# Patient Record
Sex: MALE | Race: ASIAN | Employment: OTHER | URBAN - METROPOLITAN AREA
[De-identification: names, ages, dates, MRNs, and addresses within clinical notes are randomized per-mention and may not be internally consistent; named-entity substitution may affect disease eponyms.]

---

## 2022-06-17 ENCOUNTER — HOSPITAL ENCOUNTER (INPATIENT)
Age: 77
LOS: 2 days | Discharge: HOME | DRG: 291 | End: 2022-06-20
Attending: EMERGENCY MEDICINE | Admitting: INTERNAL MEDICINE
Payer: MEDICARE

## 2022-06-17 ENCOUNTER — APPOINTMENT (OUTPATIENT)
Dept: GENERAL RADIOLOGY | Age: 77
DRG: 291 | End: 2022-06-17

## 2022-06-17 DIAGNOSIS — I50.9 CONGESTIVE HEART FAILURE, UNSPECIFIED HF CHRONICITY, UNSPECIFIED HEART FAILURE TYPE (HCC): Primary | ICD-10-CM

## 2022-06-17 LAB
ABSOLUTE EOS #: 0.2 K/UL (ref 0–0.4)
ABSOLUTE LYMPH #: 0.6 K/UL (ref 1–4.8)
ABSOLUTE MONO #: 0.3 K/UL (ref 0.1–1.2)
ANION GAP SERPL CALCULATED.3IONS-SCNC: 11 MMOL/L (ref 9–17)
BASOPHILS # BLD: 1 % (ref 0–2)
BASOPHILS ABSOLUTE: 0.1 K/UL (ref 0–0.2)
BUN BLDV-MCNC: 14 MG/DL (ref 8–23)
CALCIUM SERPL-MCNC: 9.7 MG/DL (ref 8.6–10.4)
CHLORIDE BLD-SCNC: 100 MMOL/L (ref 98–107)
CO2: 25 MMOL/L (ref 20–31)
CREAT SERPL-MCNC: 0.86 MG/DL (ref 0.7–1.2)
EOSINOPHILS RELATIVE PERCENT: 2 % (ref 1–4)
GFR AFRICAN AMERICAN: >60 ML/MIN
GFR NON-AFRICAN AMERICAN: >60 ML/MIN
GFR SERPL CREATININE-BSD FRML MDRD: ABNORMAL ML/MIN/{1.73_M2}
GLUCOSE BLD-MCNC: 178 MG/DL (ref 70–99)
HCT VFR BLD CALC: 43.1 % (ref 41–53)
HEMOGLOBIN: 14.2 G/DL (ref 13.5–17.5)
INR BLD: 1
LYMPHOCYTES # BLD: 9 % (ref 24–44)
MCH RBC QN AUTO: 29.8 PG (ref 26–34)
MCHC RBC AUTO-ENTMCNC: 33 G/DL (ref 31–37)
MCV RBC AUTO: 90.3 FL (ref 80–100)
MONOCYTES # BLD: 5 % (ref 2–11)
PDW BLD-RTO: 16.3 % (ref 12.5–15.4)
PLATELET # BLD: 214 K/UL (ref 140–450)
PMV BLD AUTO: 8.8 FL (ref 6–12)
POTASSIUM SERPL-SCNC: 5.4 MMOL/L (ref 3.7–5.3)
PRO-BNP: 2417 PG/ML
PROTHROMBIN TIME: 10.3 SEC (ref 9.4–12.6)
RBC # BLD: 4.77 M/UL (ref 4.5–5.9)
SEG NEUTROPHILS: 83 % (ref 36–66)
SEGMENTED NEUTROPHILS ABSOLUTE COUNT: 5.5 K/UL (ref 1.8–7.7)
SODIUM BLD-SCNC: 136 MMOL/L (ref 135–144)
TROPONIN, HIGH SENSITIVITY: 42 NG/L (ref 0–22)
WBC # BLD: 6.7 K/UL (ref 3.5–11)

## 2022-06-17 PROCEDURE — 71045 X-RAY EXAM CHEST 1 VIEW: CPT

## 2022-06-17 PROCEDURE — 96374 THER/PROPH/DIAG INJ IV PUSH: CPT

## 2022-06-17 PROCEDURE — 6360000002 HC RX W HCPCS: Performed by: EMERGENCY MEDICINE

## 2022-06-17 PROCEDURE — 80048 BASIC METABOLIC PNL TOTAL CA: CPT

## 2022-06-17 PROCEDURE — 83880 ASSAY OF NATRIURETIC PEPTIDE: CPT

## 2022-06-17 PROCEDURE — 36415 COLL VENOUS BLD VENIPUNCTURE: CPT

## 2022-06-17 PROCEDURE — 84484 ASSAY OF TROPONIN QUANT: CPT

## 2022-06-17 PROCEDURE — 85025 COMPLETE CBC W/AUTO DIFF WBC: CPT

## 2022-06-17 PROCEDURE — 85610 PROTHROMBIN TIME: CPT

## 2022-06-17 PROCEDURE — 99285 EMERGENCY DEPT VISIT HI MDM: CPT

## 2022-06-17 RX ORDER — TORSEMIDE 10 MG/1
10 TABLET ORAL DAILY
Status: ON HOLD | COMMUNITY
End: 2022-06-20 | Stop reason: HOSPADM

## 2022-06-17 RX ORDER — FUROSEMIDE 10 MG/ML
40 INJECTION INTRAMUSCULAR; INTRAVENOUS ONCE
Status: COMPLETED | OUTPATIENT
Start: 2022-06-17 | End: 2022-06-17

## 2022-06-17 RX ORDER — POLYETHYLENE GLYCOL 3350 17 G/17G
17 POWDER, FOR SOLUTION ORAL DAILY
COMMUNITY

## 2022-06-17 RX ORDER — LISINOPRIL 5 MG/1
5 TABLET ORAL DAILY
COMMUNITY

## 2022-06-17 RX ORDER — ACETAMINOPHEN 325 MG/1
650 TABLET ORAL EVERY 6 HOURS PRN
COMMUNITY

## 2022-06-17 RX ORDER — M-VIT,TX,IRON,MINS/CALC/FOLIC 27MG-0.4MG
1 TABLET ORAL DAILY
COMMUNITY

## 2022-06-17 RX ORDER — ATORVASTATIN CALCIUM 80 MG/1
80 TABLET, FILM COATED ORAL NIGHTLY
COMMUNITY

## 2022-06-17 RX ORDER — ASPIRIN 81 MG/1
81 TABLET, CHEWABLE ORAL DAILY
COMMUNITY

## 2022-06-17 RX ORDER — OMEPRAZOLE 20 MG/1
20 CAPSULE, DELAYED RELEASE ORAL DAILY
COMMUNITY

## 2022-06-17 RX ADMIN — FUROSEMIDE 40 MG: 10 INJECTION, SOLUTION INTRAMUSCULAR; INTRAVENOUS at 23:39

## 2022-06-17 ASSESSMENT — PAIN - FUNCTIONAL ASSESSMENT: PAIN_FUNCTIONAL_ASSESSMENT: 0-10

## 2022-06-17 ASSESSMENT — LIFESTYLE VARIABLES: HOW OFTEN DO YOU HAVE A DRINK CONTAINING ALCOHOL: NEVER

## 2022-06-17 ASSESSMENT — PAIN DESCRIPTION - LOCATION: LOCATION: CHEST

## 2022-06-17 ASSESSMENT — PAIN DESCRIPTION - FREQUENCY: FREQUENCY: INTERMITTENT

## 2022-06-17 ASSESSMENT — PAIN DESCRIPTION - ORIENTATION: ORIENTATION: MID

## 2022-06-18 ENCOUNTER — APPOINTMENT (OUTPATIENT)
Dept: GENERAL RADIOLOGY | Age: 77
DRG: 291 | End: 2022-06-18

## 2022-06-18 PROBLEM — I50.9 DECOMPENSATED HEART FAILURE (HCC): Status: ACTIVE | Noted: 2022-06-18

## 2022-06-18 PROBLEM — E11.9 DIABETES (HCC): Status: ACTIVE | Noted: 2022-06-18

## 2022-06-18 PROBLEM — I48.91 ATRIAL FIBRILLATION (HCC): Status: ACTIVE | Noted: 2022-06-18

## 2022-06-18 PROBLEM — I25.10 CORONARY ARTERY DISEASE INVOLVING NATIVE CORONARY ARTERY OF NATIVE HEART WITHOUT ANGINA PECTORIS: Status: ACTIVE | Noted: 2022-06-18

## 2022-06-18 PROBLEM — E87.5 HYPERKALEMIA: Status: ACTIVE | Noted: 2022-06-18

## 2022-06-18 PROBLEM — Z79.4 TYPE 2 DIABETES MELLITUS WITHOUT COMPLICATION, WITH LONG-TERM CURRENT USE OF INSULIN (HCC): Status: ACTIVE | Noted: 2022-06-18

## 2022-06-18 PROBLEM — I48.0 PAROXYSMAL ATRIAL FIBRILLATION (HCC): Status: ACTIVE | Noted: 2022-06-18

## 2022-06-18 PROBLEM — I10 HYPERTENSION: Status: ACTIVE | Noted: 2022-06-18

## 2022-06-18 PROBLEM — I50.43 ACUTE ON CHRONIC COMBINED SYSTOLIC AND DIASTOLIC CONGESTIVE HEART FAILURE (HCC): Status: ACTIVE | Noted: 2022-06-18

## 2022-06-18 LAB
ANION GAP SERPL CALCULATED.3IONS-SCNC: 12 MMOL/L (ref 9–17)
BUN BLDV-MCNC: 14 MG/DL (ref 8–23)
CALCIUM SERPL-MCNC: 8.4 MG/DL (ref 8.6–10.4)
CHLORIDE BLD-SCNC: 99 MMOL/L (ref 98–107)
CO2: 22 MMOL/L (ref 20–31)
CREAT SERPL-MCNC: 0.77 MG/DL (ref 0.7–1.2)
GFR AFRICAN AMERICAN: >60 ML/MIN
GFR NON-AFRICAN AMERICAN: >60 ML/MIN
GFR SERPL CREATININE-BSD FRML MDRD: ABNORMAL ML/MIN/{1.73_M2}
GLUCOSE BLD-MCNC: 145 MG/DL (ref 75–110)
GLUCOSE BLD-MCNC: 167 MG/DL (ref 75–110)
GLUCOSE BLD-MCNC: 187 MG/DL (ref 70–99)
GLUCOSE BLD-MCNC: 234 MG/DL (ref 75–110)
GLUCOSE BLD-MCNC: 93 MG/DL (ref 75–110)
POTASSIUM SERPL-SCNC: 4.1 MMOL/L (ref 3.7–5.3)
SODIUM BLD-SCNC: 133 MMOL/L (ref 135–144)
TROPONIN, HIGH SENSITIVITY: 40 NG/L (ref 0–22)
TROPONIN, HIGH SENSITIVITY: 41 NG/L (ref 0–22)

## 2022-06-18 PROCEDURE — 2580000003 HC RX 258: Performed by: NURSE PRACTITIONER

## 2022-06-18 PROCEDURE — 6370000000 HC RX 637 (ALT 250 FOR IP): Performed by: INTERNAL MEDICINE

## 2022-06-18 PROCEDURE — 99223 1ST HOSP IP/OBS HIGH 75: CPT | Performed by: INTERNAL MEDICINE

## 2022-06-18 PROCEDURE — 6370000000 HC RX 637 (ALT 250 FOR IP): Performed by: NURSE PRACTITIONER

## 2022-06-18 PROCEDURE — 94760 N-INVAS EAR/PLS OXIMETRY 1: CPT

## 2022-06-18 PROCEDURE — 2060000000 HC ICU INTERMEDIATE R&B

## 2022-06-18 PROCEDURE — 6360000002 HC RX W HCPCS: Performed by: NURSE PRACTITIONER

## 2022-06-18 PROCEDURE — 80048 BASIC METABOLIC PNL TOTAL CA: CPT

## 2022-06-18 PROCEDURE — 36415 COLL VENOUS BLD VENIPUNCTURE: CPT

## 2022-06-18 PROCEDURE — 2700000000 HC OXYGEN THERAPY PER DAY

## 2022-06-18 PROCEDURE — 97116 GAIT TRAINING THERAPY: CPT

## 2022-06-18 PROCEDURE — 71046 X-RAY EXAM CHEST 2 VIEWS: CPT

## 2022-06-18 PROCEDURE — 82947 ASSAY GLUCOSE BLOOD QUANT: CPT

## 2022-06-18 PROCEDURE — 84484 ASSAY OF TROPONIN QUANT: CPT

## 2022-06-18 PROCEDURE — 97162 PT EVAL MOD COMPLEX 30 MIN: CPT

## 2022-06-18 PROCEDURE — 97535 SELF CARE MNGMENT TRAINING: CPT

## 2022-06-18 PROCEDURE — 97166 OT EVAL MOD COMPLEX 45 MIN: CPT

## 2022-06-18 RX ORDER — SODIUM CHLORIDE 9 MG/ML
INJECTION, SOLUTION INTRAVENOUS PRN
Status: DISCONTINUED | OUTPATIENT
Start: 2022-06-18 | End: 2022-06-20 | Stop reason: HOSPADM

## 2022-06-18 RX ORDER — SODIUM CHLORIDE 0.9 % (FLUSH) 0.9 %
10 SYRINGE (ML) INJECTION PRN
Status: DISCONTINUED | OUTPATIENT
Start: 2022-06-18 | End: 2022-06-20 | Stop reason: HOSPADM

## 2022-06-18 RX ORDER — ONDANSETRON 2 MG/ML
4 INJECTION INTRAMUSCULAR; INTRAVENOUS EVERY 6 HOURS PRN
Status: DISCONTINUED | OUTPATIENT
Start: 2022-06-18 | End: 2022-06-20 | Stop reason: HOSPADM

## 2022-06-18 RX ORDER — FUROSEMIDE 10 MG/ML
40 INJECTION INTRAMUSCULAR; INTRAVENOUS 2 TIMES DAILY
Status: DISCONTINUED | OUTPATIENT
Start: 2022-06-18 | End: 2022-06-19

## 2022-06-18 RX ORDER — INSULIN LISPRO 100 [IU]/ML
0-12 INJECTION, SOLUTION INTRAVENOUS; SUBCUTANEOUS
Status: DISCONTINUED | OUTPATIENT
Start: 2022-06-18 | End: 2022-06-20 | Stop reason: HOSPADM

## 2022-06-18 RX ORDER — INSULIN GLARGINE 100 [IU]/ML
25 INJECTION, SOLUTION SUBCUTANEOUS EVERY MORNING
Status: DISCONTINUED | OUTPATIENT
Start: 2022-06-18 | End: 2022-06-18

## 2022-06-18 RX ORDER — ACETAMINOPHEN 325 MG/1
650 TABLET ORAL EVERY 6 HOURS PRN
Status: DISCONTINUED | OUTPATIENT
Start: 2022-06-18 | End: 2022-06-20 | Stop reason: HOSPADM

## 2022-06-18 RX ORDER — INSULIN LISPRO 100 [IU]/ML
0-6 INJECTION, SOLUTION INTRAVENOUS; SUBCUTANEOUS NIGHTLY
Status: DISCONTINUED | OUTPATIENT
Start: 2022-06-18 | End: 2022-06-20 | Stop reason: HOSPADM

## 2022-06-18 RX ORDER — INSULIN GLARGINE 100 [IU]/ML
25 INJECTION, SOLUTION SUBCUTANEOUS NIGHTLY
Status: DISCONTINUED | OUTPATIENT
Start: 2022-06-18 | End: 2022-06-19

## 2022-06-18 RX ORDER — ACETAMINOPHEN 650 MG/1
650 SUPPOSITORY RECTAL EVERY 6 HOURS PRN
Status: DISCONTINUED | OUTPATIENT
Start: 2022-06-18 | End: 2022-06-20 | Stop reason: HOSPADM

## 2022-06-18 RX ORDER — SODIUM CHLORIDE 0.9 % (FLUSH) 0.9 %
5-40 SYRINGE (ML) INJECTION EVERY 12 HOURS SCHEDULED
Status: DISCONTINUED | OUTPATIENT
Start: 2022-06-18 | End: 2022-06-20 | Stop reason: HOSPADM

## 2022-06-18 RX ORDER — ATORVASTATIN CALCIUM 40 MG/1
80 TABLET, FILM COATED ORAL NIGHTLY
Status: DISCONTINUED | OUTPATIENT
Start: 2022-06-18 | End: 2022-06-20 | Stop reason: HOSPADM

## 2022-06-18 RX ORDER — ATORVASTATIN CALCIUM 40 MG/1
80 TABLET, FILM COATED ORAL DAILY
Status: DISCONTINUED | OUTPATIENT
Start: 2022-06-18 | End: 2022-06-18

## 2022-06-18 RX ORDER — DEXTROSE MONOHYDRATE 50 MG/ML
100 INJECTION, SOLUTION INTRAVENOUS PRN
Status: DISCONTINUED | OUTPATIENT
Start: 2022-06-18 | End: 2022-06-20 | Stop reason: HOSPADM

## 2022-06-18 RX ORDER — POLYETHYLENE GLYCOL 3350 17 G/17G
17 POWDER, FOR SOLUTION ORAL DAILY
Status: DISCONTINUED | OUTPATIENT
Start: 2022-06-18 | End: 2022-06-20 | Stop reason: HOSPADM

## 2022-06-18 RX ORDER — ASPIRIN 81 MG/1
81 TABLET, CHEWABLE ORAL DAILY
Status: DISCONTINUED | OUTPATIENT
Start: 2022-06-18 | End: 2022-06-20 | Stop reason: HOSPADM

## 2022-06-18 RX ORDER — ENOXAPARIN SODIUM 100 MG/ML
40 INJECTION SUBCUTANEOUS DAILY
Status: DISCONTINUED | OUTPATIENT
Start: 2022-06-18 | End: 2022-06-20 | Stop reason: HOSPADM

## 2022-06-18 RX ORDER — ONDANSETRON 4 MG/1
4 TABLET, ORALLY DISINTEGRATING ORAL EVERY 8 HOURS PRN
Status: DISCONTINUED | OUTPATIENT
Start: 2022-06-18 | End: 2022-06-20 | Stop reason: HOSPADM

## 2022-06-18 RX ORDER — POLYETHYLENE GLYCOL 3350 17 G/17G
17 POWDER, FOR SOLUTION ORAL DAILY PRN
Status: DISCONTINUED | OUTPATIENT
Start: 2022-06-18 | End: 2022-06-20 | Stop reason: HOSPADM

## 2022-06-18 RX ORDER — LISINOPRIL 5 MG/1
5 TABLET ORAL DAILY
Status: DISCONTINUED | OUTPATIENT
Start: 2022-06-18 | End: 2022-06-20 | Stop reason: HOSPADM

## 2022-06-18 RX ADMIN — ATORVASTATIN CALCIUM 80 MG: 40 TABLET, FILM COATED ORAL at 01:54

## 2022-06-18 RX ADMIN — SODIUM CHLORIDE, PRESERVATIVE FREE 5 ML: 5 INJECTION INTRAVENOUS at 22:05

## 2022-06-18 RX ADMIN — POLYETHYLENE GLYCOL 3350 17 G: 17 POWDER, FOR SOLUTION ORAL at 09:41

## 2022-06-18 RX ADMIN — LISINOPRIL 5 MG: 5 TABLET ORAL at 09:45

## 2022-06-18 RX ADMIN — INSULIN LISPRO 1 UNITS: 100 INJECTION, SOLUTION INTRAVENOUS; SUBCUTANEOUS at 21:52

## 2022-06-18 RX ADMIN — INSULIN LISPRO 4 UNITS: 100 INJECTION, SOLUTION INTRAVENOUS; SUBCUTANEOUS at 13:29

## 2022-06-18 RX ADMIN — SODIUM CHLORIDE, PRESERVATIVE FREE 10 ML: 5 INJECTION INTRAVENOUS at 09:45

## 2022-06-18 RX ADMIN — ASPIRIN 81 MG CHEWABLE TABLET 81 MG: 81 TABLET CHEWABLE at 09:38

## 2022-06-18 RX ADMIN — ENOXAPARIN SODIUM 40 MG: 100 INJECTION SUBCUTANEOUS at 09:38

## 2022-06-18 RX ADMIN — INSULIN GLARGINE 25 UNITS: 100 INJECTION, SOLUTION SUBCUTANEOUS at 01:54

## 2022-06-18 RX ADMIN — ONDANSETRON 4 MG: 2 INJECTION INTRAMUSCULAR; INTRAVENOUS at 11:13

## 2022-06-18 RX ADMIN — EMPAGLIFLOZIN 10 MG: 10 TABLET, FILM COATED ORAL at 13:29

## 2022-06-18 RX ADMIN — METOPROLOL TARTRATE 25 MG: 25 TABLET, FILM COATED ORAL at 09:45

## 2022-06-18 RX ADMIN — FUROSEMIDE 40 MG: 10 INJECTION, SOLUTION INTRAMUSCULAR; INTRAVENOUS at 09:45

## 2022-06-18 ASSESSMENT — ENCOUNTER SYMPTOMS
EYES NEGATIVE: 1
SHORTNESS OF BREATH: 1
GASTROINTESTINAL NEGATIVE: 1
ALLERGIC/IMMUNOLOGIC NEGATIVE: 1

## 2022-06-18 ASSESSMENT — LIFESTYLE VARIABLES: HOW OFTEN DO YOU HAVE A DRINK CONTAINING ALCOHOL: NEVER

## 2022-06-18 NOTE — PLAN OF CARE
Problem: Pain  Goal: Verbalizes/displays adequate comfort level or baseline comfort level  6/18/2022 1619 by Avi Tyler RN  Outcome: Progressing     Problem: Safety - Adult  Goal: Free from fall injury  6/18/2022 1619 by Avi Tyler RN  Outcome: Progressing  Flowsheets (Taken 6/18/2022 0800)  Free From Fall Injury: Instruct family/caregiver on patient safety

## 2022-06-18 NOTE — PROGRESS NOTES
Pt arrived via stretcher, stood and walked to bed without difficulty. Pt hooked up to portable monitor and vitals obtained. Pt oriented to call light and room. Pt aoX4, states not having pain, resting and comfortable. Bedside swallow done and pt passed with no issues.

## 2022-06-18 NOTE — PLAN OF CARE
Problem: Discharge Planning  Goal: Discharge to home or other facility with appropriate resources  Outcome: Progressing  Flowsheets (Taken 6/18/2022 0105)  Discharge to home or other facility with appropriate resources: Identify barriers to discharge with patient and caregiver     Problem: Pain  Goal: Verbalizes/displays adequate comfort level or baseline comfort level  Outcome: Progressing  Flowsheets (Taken 6/18/2022 0110)  Verbalizes/displays adequate comfort level or baseline comfort level: Assess pain using appropriate pain scale     Problem: Safety - Adult  Goal: Free from fall injury  Outcome: Progressing     Problem: ABCDS Injury Assessment  Goal: Absence of physical injury  Outcome: Progressing

## 2022-06-18 NOTE — ED PROVIDER NOTES
eMERGENCY dEPARTMENT eNCOUnter      Pt Name: Allyssa Moore  MRN: [id number]  [place] 1945  Date of evaluation: 6/17/2022      CHIEF COMPLAINT       Chief Complaint   Patient presents with    Shortness of Breath     shortness of breath started this morning was getting readings 85% O2           HISTORY OF PRESENT ILLNESS    Allyssa Moore is a 69 y.o. male who presents urgency department for evaluation of shortness of breath this neck basically started this morning), keeps getting worse. We are getting readings of some around 86% on room air patient. Patient is not on any oxygen he is 2 weeks postop from a CABG. Apparently he had his diuretic stopped at another hospital for some reason. He did admits to no chest pain. Has no sign of infectious disease. REVIEW OF SYSTEMS       Review of Systems   Constitutional: Negative. HENT: Negative. Eyes: Negative. Respiratory: Positive for shortness of breath. Cardiovascular: Negative. Gastrointestinal: Negative. Endocrine: Negative. Genitourinary: Negative. Musculoskeletal: Negative. Skin: Negative. Allergic/Immunologic: Negative. Neurological: Negative. Hematological: Negative. Psychiatric/Behavioral: Negative. PAST MEDICAL HISTORY    has a past medical history of Atrial fibrillation ([address] Chronic combined systolic and diastolic congestive heart failure ([address] Chronic kidney disease, Coronary artery disease involving native coronary artery of native heart without angina pectoris, Diabetes ([address] and Hypertension. SURGICAL HISTORY      has a past surgical history that includes Coronary artery bypass graft.     CURRENT MEDICATIONS       Discharge Medication List as of 6/20/2022 11:59 AM      CONTINUE these medications which have NOT CHANGED    Details   acetaminophen (TYLENOL) 325 MG tablet Take 650 mg by mouth every 6 hours as needed for PainHistorical Med      aspirin 81 MG chewable tablet Take 81 mg by mouth dailyHistorical Med      atorvastatin (LIPITOR) 80 MG tablet Take 80 mg by mouth nightly Historical Med      magnesium hydroxide (MILK OF MAGNESIA) 400 MG/5ML suspension Take by mouth daily as needed for ConstipationHistorical Med      omeprazole (PRILOSEC) 20 MG delayed release capsule Take 20 mg by mouth daily Take for 14 daysHistorical Med      polyethylene glycol (GLYCOLAX) 17 g packet Take 17 g by mouth dailyHistorical Med      Multiple Vitamins-Minerals (THERAPEUTIC MULTIVITAMIN-MINERALS) tablet Take 1 tablet by mouth dailyHistorical Med      dapagliflozin (FARXIGA) 10 MG tablet Take 10 mg by mouth every morningHistorical Med      Insulin Degludec 100 UNIT/ML SOLN Inject 32 Units into the skin nightly Historical Med      lisinopril (PRINIVIL;ZESTRIL) 5 MG tablet Take 5 mg by mouth dailyHistorical Med      metoprolol tartrate (LOPRESSOR) 25 MG tablet Take 25 mg by mouth daily Take half a tablet by mouth once dailyHistorical Med             ALLERGIES     is allergic to penicillins. FAMILY HISTORY     He indicated that the status of his father is unknown. He indicated that the status of his brother is unknown.     family history includes Diabetes in his brother and father. SOCIAL HISTORY      reports that he has quit smoking. He has never used smokeless tobacco. He reports previous alcohol use. He reports that he does not use drugs. PHYSICAL EXAM     INITIAL VITALS:  height is 5' 4\" (1.626 m) and weight is 28.1 kg (62 lb). His oral temperature is 98.4 °F (36.9 °C). His blood pressure is 103/53 (abnormal) and his pulse is 87. His respiration is 16 and oxygen saturation is 93%.      Constitutional: Alert, oriented x3, nontoxic, afebrile, answering questions appropriately, acting properly for age, in no acute distress  HEENT: Extraocular muscles intact, mucus membranes moist, TMs clear bilaterally, no posterior pharyngeal erythema or exudates, Pupils equal, round, reactive to light,   Neck: Trachea midline, Supple without lymphadenopathy, no posterior midline neck tenderness to palpation  Cardiovascular: Regular rhythm and rate no S3, S4, or murmurs  Respiratory: Clear to auscultation bilaterally no wheezes, rhonchi, rales, no respiratory distress  Gastrointestinal: Soft, nontender, nondistended, positive bowel sounds. No rebound, rigidity, or guarding. Musculoskeletal: No extremity pain or swelling  Neurologic: Moving all 4 extremities without difficulty there are no gross focal neurologic deficits  Skin: Warm and dry      DIFFERENTIAL DIAGNOSIS/ MDM:     Shortness of breath uncertain etiology patient will get a cardiac work-up with BNP and be reevaluated.     DIAGNOSTIC RESULTS     EKG: All EKG's are interpreted by the Emergency Department Physician who either signs or Co-signs this chart in the absence of a cardiologist.        Not indicated unless otherwise documented above    LABS:  Results for orders placed or performed during the hospital encounter of 06/17/22   CBC with Auto Differential   Result Value Ref Range    WBC 6.7 3.5 - 11.0 k/uL    RBC 4.77 4.5 - 5.9 m/uL    Hemoglobin 14.2 13.5 - 17.5 g/dL    Hematocrit 43.1 41 - 53 %    MCV 90.3 80 - 100 fL    MCH 29.8 26 - 34 pg    MCHC 33.0 31 - 37 g/dL    RDW 16.3 (H) 12.5 - 15.4 %    Platelets 194 276 - 623 k/uL    MPV 8.8 6.0 - 12.0 fL    Seg Neutrophils 83 (H) 36 - 66 %    Lymphocytes 9 (L) 24 - 44 %    Monocytes 5 2 - 11 %    Eosinophils % 2 1 - 4 %    Basophils 1 0 - 2 %    Segs Absolute 5.50 1.8 - 7.7 k/uL    Absolute Lymph # 0.60 (L) 1.0 - 4.8 k/uL    Absolute Mono # 0.30 0.1 - 1.2 k/uL    Absolute Eos # 0.20 0.0 - 0.4 k/uL    Basophils Absolute 0.10 0.0 - 0.2 k/uL   Brain Natriuretic Peptide   Result Value Ref Range    Pro-BNP 2,417 (H) <300 pg/mL   Basic Metabolic Panel   Result Value Ref Range    Glucose 178 (H) 70 - 99 mg/dL    BUN 14 8 - 23 mg/dL    CREATININE 0.86 0.70 - 1.20 mg/dL    Calcium 9.7 8.6 - 10.4 mg/dL    Sodium 136 135 - 144 mmol/L Potassium 5.4 (H) 3.7 - 5.3 mmol/L    Chloride 100 98 - 107 mmol/L    CO2 25 20 - 31 mmol/L    Anion Gap 11 9 - 17 mmol/L    GFR Non-African American >60 >60 mL/min    GFR African American >60 >60 mL/min    GFR Comment         Troponin   Result Value Ref Range    Troponin, High Sensitivity 42 (H) 0 - 22 ng/L   Protime-INR   Result Value Ref Range    Protime 10.3 9.4 - 12.6 sec    INR 1.0    Troponin   Result Value Ref Range    Troponin, High Sensitivity 40 (H) 0 - 22 ng/L   Troponin   Result Value Ref Range    Troponin, High Sensitivity 41 (H) 0 - 22 ng/L   Basic Metabolic Panel   Result Value Ref Range    Glucose 187 (H) 70 - 99 mg/dL    BUN 14 8 - 23 mg/dL    CREATININE 0.77 0.70 - 1.20 mg/dL    Calcium 8.4 (L) 8.6 - 10.4 mg/dL    Sodium 133 (L) 135 - 144 mmol/L    Potassium 4.1 3.7 - 5.3 mmol/L    Chloride 99 98 - 107 mmol/L    CO2 22 20 - 31 mmol/L    Anion Gap 12 9 - 17 mmol/L    GFR Non-African American >60 >60 mL/min    GFR African American >60 >60 mL/min    GFR Comment         Magnesium   Result Value Ref Range    Magnesium 2.2 1.6 - 2.6 mg/dL   Brain Natriuretic Peptide   Result Value Ref Range    Pro-BNP 1,090 (H) <300 pg/mL   TSH without Reflex   Result Value Ref Range    TSH 1.40 0.30 - 5.00 uIU/mL   Basic Metabolic Panel w/ Reflex to MG   Result Value Ref Range    Glucose 110 (H) 70 - 99 mg/dL    BUN 21 8 - 23 mg/dL    CREATININE 0.96 0.70 - 1.20 mg/dL    Calcium 8.8 8.6 - 10.4 mg/dL    Sodium 137 135 - 144 mmol/L    Potassium 4.2 3.7 - 5.3 mmol/L    Chloride 102 98 - 107 mmol/L    CO2 25 20 - 31 mmol/L    Anion Gap 10 9 - 17 mmol/L    GFR Non-African American >60 >60 mL/min    GFR African American >60 >60 mL/min    GFR Comment         POC Glucose Fingerstick   Result Value Ref Range    POC Glucose 145 (H) 75 - 110 mg/dL   POC Glucose Fingerstick   Result Value Ref Range    POC Glucose 234 (H) 75 - 110 mg/dL   POC Glucose Fingerstick   Result Value Ref Range    POC Glucose 93 75 - 110 mg/dL   POC Glucose Fingerstick   Result Value Ref Range    POC Glucose 167 (H) 75 - 110 mg/dL   POC Glucose Fingerstick   Result Value Ref Range    POC Glucose 185 (H) 75 - 110 mg/dL   POC Glucose Fingerstick   Result Value Ref Range    POC Glucose 177 (H) 75 - 110 mg/dL   POC Glucose Fingerstick   Result Value Ref Range    POC Glucose 165 (H) 75 - 110 mg/dL   POC Glucose Fingerstick   Result Value Ref Range    POC Glucose 204 (H) 75 - 110 mg/dL       Not indicated unless otherwise documented above    RADIOLOGY:   I reviewed the radiologist interpretations:  XR CHEST (2 VW)   Final Result   Mildly improved pulmonary vascular congestion         XR CHEST PORTABLE   Final Result   Findings compatible with interstitial edema with small pleural effusions.              Not indicated unless otherwise documented above    EMERGENCY DEPARTMENT COURSE:     The patient was given the following medications:  Orders Placed This Encounter   Medications    furosemide (LASIX) injection 40 mg    DISCONTD: aspirin chewable tablet 81 mg    DISCONTD: atorvastatin (LIPITOR) tablet 80 mg    DISCONTD: insulin glargine (LANTUS) injection vial 25 Units    DISCONTD: lisinopril (PRINIVIL;ZESTRIL) tablet 5 mg    DISCONTD: metoprolol tartrate (LOPRESSOR) tablet 25 mg    DISCONTD: polyethylene glycol (GLYCOLAX) packet 17 g    DISCONTD: sodium chloride flush 0.9 % injection 5-40 mL    DISCONTD: sodium chloride flush 0.9 % injection 10 mL    DISCONTD: 0.9 % sodium chloride infusion    DISCONTD: ondansetron (ZOFRAN-ODT) disintegrating tablet 4 mg    DISCONTD: ondansetron (ZOFRAN) injection 4 mg    DISCONTD: polyethylene glycol (GLYCOLAX) packet 17 g    DISCONTD: acetaminophen (TYLENOL) tablet 650 mg    DISCONTD: acetaminophen (TYLENOL) suppository 650 mg    DISCONTD: enoxaparin (LOVENOX) injection 40 mg     Order Specific Question:   Indication of Use     Answer:   Prophylaxis-DVT/PE    DISCONTD: furosemide (LASIX) injection 40 mg    DISCONTD: insulin glargine (LANTUS) injection vial 25 Units    DISCONTD: atorvastatin (LIPITOR) tablet 80 mg    DISCONTD: glucose chewable tablet 16 g    DISCONTD: dextrose bolus 10% 125 mL    DISCONTD: dextrose bolus 10% 250 mL    DISCONTD: glucagon (rDNA) injection 1 mg    DISCONTD: dextrose 5 % solution    DISCONTD: insulin lispro (HUMALOG) injection vial 0-12 Units    DISCONTD: insulin lispro (HUMALOG) injection vial 0-6 Units    DISCONTD: empagliflozin (JARDIANCE) tablet 10 mg     Order Specific Question:   Indication of Use     Answer:   Heart Failure (reduced EF)    DISCONTD: furosemide (LASIX) injection 20 mg    DISCONTD: insulin glargine (LANTUS) injection vial 15 Units    DISCONTD: melatonin tablet 5 mg    DISCONTD: furosemide (LASIX) tablet 20 mg    DISCONTD: furosemide (LASIX) 20 MG tablet     Sig: Take 1 tablet by mouth daily     Dispense:  60 tablet     Refill:  3    furosemide (LASIX) 20 MG tablet     Sig: Take 1 tablet by mouth daily     Dispense:  30 tablet     Refill:  3        Vitals:    Vitals:    06/20/22 0600 06/20/22 0704 06/20/22 0826 06/20/22 0833   BP:   (!) 106/51 (!) 103/53   Pulse:   70 87   Resp:   16    Temp:   98.4 °F (36.9 °C)    TempSrc:   Oral    SpO2:  96% 93%    Weight: 28.1 kg (62 lb)      Height:         -------------------------  BP (!) 103/53   Pulse 87   Temp 98.4 °F (36.9 °C) (Oral)   Resp 16   Ht 5' 4\" (1.626 m)   Wt 28.1 kg (62 lb)   SpO2 93%   BMI 10.64 kg/m²         I have reviewed the disposition diagnosis with the patient and or their family/guardian. I have answered their questions and given discharge instructions. They voiced understanding of these instructions and did not have any further questions or complaints. CRITICAL CARE:    None    CONSULTS:    7:29 PM    Spoke with the cardiac fellow twice his name is Dr. Madison Cordoba did speak with the on-call attending cardiothoracic surgeon.   From their standpoint there is nothing that they can need to do to intervene surgically so they do not think the patient needs to be emergently sent back to [facility]. However the patient does need extensive diuresis and is my opinion that this patient probably should stay in the hospital.  I mention this with the patient. 7:29 PM  After long discussion with the patient and with members of his family patient is finally decided that he will stay in the hospital for admission I talked to Ammon Paiz patient will be sent upstairs. PROCEDURES:    None      OARRS Report if indicated             FINAL IMPRESSION      1.  Congestive heart failure, unspecified HF chronicity, unspecified heart failure type ([facility] [address]          DISPOSITION/PLAN   DISPOSITION Admitted      PATIENT REFERRED TO:  pcp    In 1 week      cardiology    In 2 weeks      Aubrie Mathur MD  [place] [address]  [address]  [phone]    Call in 1 week  For follow-up      DISCHARGE MEDICATIONS:  Discharge Medication List as of 6/20/2022 11:59 AM          (Please note that portions of this note were completed with a voice recognition program.  Efforts were made to edit the dictations but occasionally words are mis-transcribed.)    Raghu Baeza MD,, MD  Attending Emergency Physician            Raghu Baeza MD  06/18/22 199 [facility], MD  06/22/22 [id number]

## 2022-06-18 NOTE — H&P
[facility]  Office: [address], DO, Maritza Alex, DO, Abdon Antonio, DO, Tristan Bailey Blood, DO, Marie Mcnulty MD, Ant Toribio MD, Juve Palmer MD, Christian Mistry MD, Javed Tidwell MD, Megan Bo MD, Rachelle Hall MD, Cuca Xie, DO, Tian Barney MD,  Harshad Haile DO, Fam Esparza MD, Shankar Lund MD, Soledad Hinojosa, DO, Derick Garcia MD, Tin Romero MD, Damian Pa DO, Teddy Schuster MD, Ave Ware MD, Luan Morales [facility], [facility], [facility], Genny Rubi, CNP, Christine Pires, CNP, Hussain Monique, CNP, Chrissy Jerez, CNP, Catheryn Leventhal, PA-C, Emerita Hodge, [facility], Shania Ortiz, CNP, Leeann Mcgraw, CNP, Parag Smith, CNP, Marcella Brown, CNS, Leigha Petit, [facility], Renita Gunter, CNP, Lavelle Suero, CNP, Elly Ramsay, [facility]   [address] [facility]    HISTORY AND PHYSICAL EXAMINATION            Date:   6/18/2022  Patient name:  Vonnie Sanchez  Date of admission:  6/17/2022  9:05 PM  MRN:   [id number]  Account:  [de-identified]  YOB: 1945  PCP:    No primary care provider on file. Room:   [address] [place] [address]  Code Status:    Full Code    Chief Complaint:     Chief Complaint   Patient presents with    Shortness of Breath     shortness of breath started this morning was getting readings 85% O2        History Obtained From:     patient, spouse, electronic medical record    History of Present Illness:     Vonnie Sanchez is a 69 y.o. Non- / non  male who presents with Shortness of Breath (shortness of breath started this morning was getting readings 85% O2 )   and is admitted to the hospital for the management of Acute on chronic combined systolic and diastolic congestive heart failure ([facility] [address] This 69 yom presents with sob and flores along with reduced energy level. He had cabg 1 vessel at [facility] 5/23 and was seen in followup there on 6/10 at which time his demadex was stopped.     He has had a little cough but mainly the sob. His o2 sat at home was 84% on ra. He does have o2 tank at home (not prescribed, just something he bought a few years ago). Not clear when he uses it or why, hasn't used it in few months and implied it no longer works. Past Medical History:     Past Medical History:   Diagnosis Date    Atrial fibrillation (HCC)     Chronic combined systolic and diastolic congestive heart failure (HCC)     Chronic kidney disease     Coronary artery disease involving native coronary artery of native heart without angina pectoris     Diabetes ([facility] [address]     Hypertension         Past Surgical History:     Past Surgical History:   Procedure Laterality Date    CORONARY ARTERY BYPASS GRAFT          Medications Prior to Admission:     Prior to Admission medications    Medication Sig Start Date End Date Taking?  Authorizing Provider   acetaminophen (TYLENOL) 325 MG tablet Take 650 mg by mouth every 6 hours as needed for Pain   Yes Historical Provider, MD   aspirin 81 MG chewable tablet Take 81 mg by mouth daily   Yes Historical Provider, MD   atorvastatin (LIPITOR) 80 MG tablet Take 80 mg by mouth nightly    Yes Historical Provider, MD   magnesium hydroxide (MILK OF MAGNESIA) 400 MG/5ML suspension Take by mouth daily as needed for Constipation   Yes Historical Provider, MD   omeprazole (PRILOSEC) 20 MG delayed release capsule Take 20 mg by mouth daily Take for 14 days   Yes Historical Provider, MD   polyethylene glycol (GLYCOLAX) 17 g packet Take 17 g by mouth daily   Yes Historical Provider, MD   Multiple Vitamins-Minerals (THERAPEUTIC MULTIVITAMIN-MINERALS) tablet Take 1 tablet by mouth daily   Yes Historical Provider, MD   torsemide (DEMADEX) 10 MG tablet Take 10 mg by mouth daily   Yes Historical Provider, MD   dapagliflozin (FARXIGA) 10 MG tablet Take 10 mg by mouth every morning   Yes Historical Provider, MD   Insulin Degludec 100 UNIT/ML SOLN Inject 32 Units into the skin nightly    Yes Historical Provider, MD   lisinopril (PRINIVIL;ZESTRIL) 5 MG tablet Take 5 mg by mouth daily   Yes Historical Provider, MD   metoprolol tartrate (LOPRESSOR) 25 MG tablet Take 25 mg by mouth daily Take half a tablet by mouth once daily   Yes Historical Provider, MD        Allergies:     Penicillins    Social History:     Tobacco:    reports that he has quit smoking. He has never used smokeless tobacco.  Alcohol:      reports previous alcohol use. Drug Use:  reports no history of drug use. Family History:     Family History   Problem Relation Age of Onset    Diabetes Father     Diabetes Brother        Review of Systems:     Positive and Negative as described in HPI.     CONSTITUTIONAL:  negative for fevers, chills, sweats, weight loss  HEENT:  negative for vision, hearing changes, runny nose, throat pain  RESPIRATORY:  negative for congestion, wheezing  CARDIOVASCULAR:  negative for palpitations; does have some right sided cp  GASTROINTESTINAL:  negative for nausea, vomiting, diarrhea, change in bowel habits, abdominal pain   GENITOURINARY:  negative for difficulty of urination, burning with urination, frequency   INTEGUMENT:  negative for rash, skin lesions, easy bruising   HEMATOLOGIC/LYMPHATIC:  negative for swelling/edema   ALLERGIC/IMMUNOLOGIC:  negative for urticaria , itching  ENDOCRINE:  negative increase in drinking, increase in urination, hot or cold intolerance  MUSCULOSKELETAL:  negative joint pains, muscle aches, swelling of joints  NEUROLOGICAL:  negative for headaches, dizziness, lightheadedness, numbness, pain, tingling extremities  BEHAVIOR/PSYCH:  negative for depression, anxiety    Physical Exam:   BP (!) 92/53   Pulse 63   Temp 98.6 °F (37 °C)   Resp 16   Ht 5' 4\" (1.626 m)   Wt 137 lb 2 oz (62.2 kg)   SpO2 90%   BMI 23.54 kg/m²   Temp (24hrs), Av.2 °F (36.8 °C), Min:97.9 °F (36.6 °C), Max:98.6 °F (37 °C)    Recent Labs     22  0823 22  1218   POCGLU 145* 234* Intake/Output Summary (Last 24 hours) at 6/18/2022 1302  Last data filed at 6/18/2022 0200  Gross per 24 hour   Intake --   Output 1375 ml   Net -1375 ml       General Appearance:  alert, well appearing, and in no acute distress  Mental status: oriented to person, place, and time  Head:  normocephalic, atraumatic  Eye: no icterus, redness, pupils equal and reactive, extraocular eye movements intact, conjunctiva clear  Ear: normal external ear, no discharge, hearing intact  Nose:  no drainage noted  Mouth: mucous membranes moist  Neck: supple, no carotid bruits, thyroid not palpable  Lungs: Bilateral crackles, no wheezing or rhonchi, normal effort  Cardiovascular: normal rate, regular rhythm, no murmur, gallop, rub  Abdomen: Soft, nontender, nondistended, normal bowel sounds, no hepatomegaly or splenomegaly  Neurologic: There are no new focal motor or sensory deficits, normal muscle tone and bulk, no abnormal sensation, normal speech, cranial nerves II through XII grossly intact  Skin: midline sternum incision looks good  Extremities:  peripheral pulses palpable, no pedal edema or calf pain with palpation  Psych: normal affect     Investigations:      Laboratory Testing:  Recent Results (from the past 24 hour(s))   CBC with Auto Differential    Collection Time: 06/17/22  9:20 PM   Result Value Ref Range    WBC 6.7 3.5 - 11.0 k/uL    RBC 4.77 4.5 - 5.9 m/uL    Hemoglobin 14.2 13.5 - 17.5 g/dL    Hematocrit 43.1 41 - 53 %    MCV 90.3 80 - 100 fL    MCH 29.8 26 - 34 pg    MCHC 33.0 31 - 37 g/dL    RDW 16.3 (H) 12.5 - 15.4 %    Platelets 442 736 - 765 k/uL    MPV 8.8 6.0 - 12.0 fL    Seg Neutrophils 83 (H) 36 - 66 %    Lymphocytes 9 (L) 24 - 44 %    Monocytes 5 2 - 11 %    Eosinophils % 2 1 - 4 %    Basophils 1 0 - 2 %    Segs Absolute 5.50 1.8 - 7.7 k/uL    Absolute Lymph # 0.60 (L) 1.0 - 4.8 k/uL    Absolute Mono # 0.30 0.1 - 1.2 k/uL    Absolute Eos # 0.20 0.0 - 0.4 k/uL    Basophils Absolute 0.10 0.0 - 0.2 k/uL Brain Natriuretic Peptide    Collection Time: 06/17/22  9:20 PM   Result Value Ref Range    Pro-BNP 2,417 (H) <300 pg/mL   Basic Metabolic Panel    Collection Time: 06/17/22  9:20 PM   Result Value Ref Range    Glucose 178 (H) 70 - 99 mg/dL    BUN 14 8 - 23 mg/dL    CREATININE 0.86 0.70 - 1.20 mg/dL    Calcium 9.7 8.6 - 10.4 mg/dL    Sodium 136 135 - 144 mmol/L    Potassium 5.4 (H) 3.7 - 5.3 mmol/L    Chloride 100 98 - 107 mmol/L    CO2 25 20 - 31 mmol/L    Anion Gap 11 9 - 17 mmol/L    GFR Non-African American >60 >60 mL/min    GFR African American >60 >60 mL/min    GFR Comment         Troponin    Collection Time: 06/17/22  9:20 PM   Result Value Ref Range    Troponin, High Sensitivity 42 (H) 0 - 22 ng/L   Protime-INR    Collection Time: 06/17/22  9:20 PM   Result Value Ref Range    Protime 10.3 9.4 - 12.6 sec    INR 1.0    Troponin    Collection Time: 06/18/22  1:51 AM   Result Value Ref Range    Troponin, High Sensitivity 40 (H) 0 - 22 ng/L   Troponin    Collection Time: 06/18/22  6:38 AM   Result Value Ref Range    Troponin, High Sensitivity 41 (H) 0 - 22 ng/L   Basic Metabolic Panel    Collection Time: 06/18/22  6:38 AM   Result Value Ref Range    Glucose 187 (H) 70 - 99 mg/dL    BUN 14 8 - 23 mg/dL    CREATININE 0.77 0.70 - 1.20 mg/dL    Calcium 8.4 (L) 8.6 - 10.4 mg/dL    Sodium 133 (L) 135 - 144 mmol/L    Potassium 4.1 3.7 - 5.3 mmol/L    Chloride 99 98 - 107 mmol/L    CO2 22 20 - 31 mmol/L    Anion Gap 12 9 - 17 mmol/L    GFR Non-African American >60 >60 mL/min    GFR African American >60 >60 mL/min    GFR Comment         POC Glucose Fingerstick    Collection Time: 06/18/22  8:23 AM   Result Value Ref Range    POC Glucose 145 (H) 75 - 110 mg/dL   POC Glucose Fingerstick    Collection Time: 06/18/22 12:18 PM   Result Value Ref Range    POC Glucose 234 (H) 75 - 110 mg/dL       Imaging/Diagnostics:  XR CHEST PORTABLE    Result Date: 6/17/2022  Findings compatible with interstitial edema with small pleural effusions. Assessment :      Hospital Problems           Last Modified POA    * (Principal) Acute on chronic combined systolic and diastolic congestive heart failure ([facility] [address] 6/18/2022 Yes    Coronary artery disease involving native coronary artery of native heart without angina pectoris 6/18/2022 Yes    Paroxysmal atrial fibrillation ([address] 6/18/2022 Yes    Type 2 diabetes mellitus without complication, with long-term current use of insulin ([facility] [address] 6/18/2022 Yes    Primary hypertension 6/18/2022 Yes    Hyperkalemia 6/18/2022 Yes          Plan:     Patient status inpatient in the Progressive Unit/Step down    1. Prior echo reviewed from [facility]  2. Iv diuresis, suspect he needs daily diuretic  3. Monitor/control glucose; insulin scale  4. Check labs now: need repeat k and renal function  5. Close monitoring renal function  6. Adjust diet for chf and dm  7. Monitor heart rhythm  8. Elevated troponin in setting of recent cabg and decompensated chf is not significant-does not have acs    Consultations:   [facility] NURSE/COORDINATOR  IP CONSULT TO DIETITIAN     Patient is admitted as inpatient status because of co-morbidities listed above, severity of signs and symptoms as outlined, requirement for current medical therapies and most importantly because of direct risk to patient if care not provided in a hospital setting. Expected length of stay > 48 hours. Gurpreet Luu DO  6/18/2022  1:02 PM    Copy sent to Dr. Gong primary care provider on file.

## 2022-06-18 NOTE — PROGRESS NOTES
Occupational Therapy  Facility/Department: [facility]  Occupational Therapy Initial Assessment    Name: Christal Minor  : 1945  MRN: [id number]  Date of Service: 2022    Discharge Recommendations:  Patient would benefit from continued therapy after discharge  OT Equipment Recommendations  Equipment Needed: Yes  Mobility Devices: ADL Assistive Devices  ADL Assistive Devices: Toileting - Raised Toilet Seat with arms;Transfer Tub Bench       Patient Diagnosis(es): The encounter diagnosis was Congestive heart failure, unspecified HF chronicity, unspecified heart failure type ([facility] [address] Past Medical History:  has a past medical history of Atrial fibrillation ([facility] [address] Chronic combined systolic and diastolic congestive heart failure ([facility] [address] Chronic kidney disease, Coronary artery disease involving native coronary artery of native heart without angina pectoris, Diabetes ([facility] [address] and Hypertension. Past Surgical History:  has a past surgical history that includes Coronary artery bypass graft. Assessment   Performance deficits / Impairments: Decreased functional mobility ; Decreased ADL status; Decreased safe awareness;Decreased cognition;Decreased endurance;Decreased balance;Decreased high-level IADLs  Assessment: Pt agreeable to OT eval this date. Pt engaged in functional transfers with CGA and functional mobility with grossly CGA however demonstrated 1 gross LOB requiring Mod A to correct. Pt exhibited 2 other mild LOBs that were self corrected. Pt completed toileting task with grossly CGA however deficits in dynamic standing balance noted. Pt's SpO2 dropped to 88% for ~5 seconds during activity however quickly returned to 92% in <1 min. Pt demonstrates above deficits listed and will benefit from continued OT services to increase safety and independence with ADLs/IADLs and functional transfers/mobility.   Prognosis: Good  Decision Making: Medium Complexity  REQUIRES OT FOLLOW-UP: Yes  Activity Tolerance  Activity Tolerance: Patient Tolerated treatment well        Plan   Plan  Times per Week: 5 x/wk  Current Treatment Recommendations: Balance training,Functional mobility training,Endurance training,Cognitive reorientation,Safety education & training,Patient/Caregiver education & training,Equipment evaluation, education, & procurement,Self-Care / ADL     Restrictions  Restrictions/Precautions  Restrictions/Precautions: Fall Risk  Required Braces or Orthoses?: No  Position Activity Restriction  Other position/activity restrictions: up with assistance, 2 weeks postop CABG    Subjective   General  Patient assessed for rehabilitation services?: Yes  Family / Caregiver Present: Yes (wife present)  General Comment  Comments: RN ok'd pt for OT eval this date. Pt agreeable to session and cooperative throughout. Pt denies pain     Social/Functional History  Social/Functional History  Lives With: Spouse  Type of Home: House  Home Layout: Two level,Able to Live on Main level with bedroom/bathroom  Home Access: Stairs to enter without rails  Entrance Stairs - Number of Steps: 4  Bathroom Shower/Tub: Tub/Shower unit  Bathroom Toilet: Standard  Home Equipment: Cane,Walker, rolling (typically uses cane within home and RW for community distances; pt's wife reports working on approval for a w/c)  ADL Assistance: Independent  Homemaking Responsibilities: No (wife completes)  Ambulation Assistance: Independent  Transfer Assistance: Independent  Active : Yes (pt reports not driving since sx; wife currently drives)  Mode of Transportation: Car  Occupation: Retired  Type of Occupation: teacher, college (math)  Leisure & Hobbies: play [place]  Additional Comments: Pt reports [address] to [address] prior to sx; receives home nursing currently.  Wife is available 24/7 if needed       Objective   Vision Exceptions: Wears glasses for reading  Hearing: Exceptions to [facility]  Hearing Exceptions: Hard of hearing/hearing concerns Safety Devices  Type of Devices: Call light within reach; Chair alarm in place; Left in chair;Nurse notified  Restraints  Restraints Initially in Place: No     Bed Mobility Training  Bed Mobility Training: Yes  Overall Level of Assistance: Contact-guard assistance  Interventions: Safety awareness training  Supine to Sit: Contact-guard assistance (HOB elevated ~40 degrees)  Sit to Supine:  (pt retired to recliner at end of session)  Balance  Sitting: With support (static and dynamic sitting at EOB and in recliner ~10 minutes with SBA)  Standing: With support (CGA static and Min A dynamic ~4 minutes)  Transfer Training  Transfer Training: Yes  Overall Level of Assistance: Contact-guard assistance (utilized str cane)  Interventions: Safety awareness training  Sit to Stand: Contact-guard assistance; Adaptive equipment  Stand to Sit: Contact-guard assistance; Adaptive equipment  Toilet Transfer:  (stood to void this date)  Gait  Overall Level of Assistance: Contact-guard assistance; Adaptive equipment (pt completed functional mobility within hospital room and to/from bathroom with CGA and str cane use. Pt exhibited 1 gross LOB requiring Mod A to correct. 2 other minor LOB, self corrected.)  Interventions: Safety awareness training; Tactile cues; Verbal cues    AROM: Within functional limits  Strength:  Within functional limits  Coordination: Within functional limits  Tone: Normal  Sensation: Impaired (numbness to 4-5 digits of L hand; ~2 months)     ADL  Feeding: Modified independent ;Setup  Grooming: Modified independent ;Setup  UE Bathing: Stand by assistance;Setup;Verbal cueing  LE Bathing: Minimal assistance;Setup;Verbal cueing  UE Dressing: Stand by assistance;Setup;Verbal cueing  UE Dressing Skilled Clinical Factors: pt doffed dirty and donned clean gown/s to front/back with SBA and VCs for proper orientation  LE Dressing: Minimal assistance;Setup;Verbal cueing  LE Dressing Skilled Clinical Factors: pt donned slip-on shoes with CGA; expect increased assistance for functional reach to foot level  Toileting: Setup; Increased time to complete;Contact guard assistance  Toileting Skilled Clinical Factors: Pt stood at toilet to void with CGA throughout static standing and to doff/don underwear to thigh level                Cognition  Overall Cognitive Status: Exceptions  Following Commands: Follows one step commands with increased time; Follows one step commands with repetition  Attention Span: Attends with cues to redirect  Safety Judgement: Decreased awareness of need for assistance;Decreased awareness of need for safety  Problem Solving: Assistance required to identify errors made;Assistance required to correct errors made  Insights: Decreased awareness of deficits  Initiation: Requires cues for some               Education Given To: Patient  Education Provided Comments: Pt ed on OT role, OT POC, safety awareness, fall prevention, and importance of continued OT. Pt and wife verbalized good understanding however further education is still recommended to ensure carryover.   Education Method: Demonstration;Verbal  Barriers to Learning: Cognition  Education Outcome: Verbalized understanding;Continued education needed     LUE AROM (degrees)  LUE AROM : WFL  Left Hand AROM (degrees)  Left Hand AROM: WFL  RUE AROM (degrees)  RUE AROM : WFL  Right Hand AROM (degrees)  Right Hand AROM: WFL                     AM-PAC Score  AM-PAC Inpatient Daily Activity Raw Score: 20 (06/18/22 1213)  AM-PAC Inpatient ADL T-Scale Score : 42.03 (06/18/22 1213)  ADL Inpatient CMS 0-100% Score: 38.32 (06/18/22 1213)  ADL Inpatient CMS G-Code Modifier : CJ (06/18/22 1213)    Goals  Short Term Goals  Time Frame for Short term goals: By discharge, pt will:  Short Term Goal 1: Demo Mod I for functional transfers and functional mobility with use of LRD for engagement in ADLs/IADLs  Short Term Goal 2: Demo I with UB ADLs  Short Term Goal 3: Demo Mod I for LB ADLs and toileting tasks with AE use PRN  Short Term Goal 4: Demo 8+ min dynamic standing and reaching outside CHRISTOPHER with unilateral hand release and SUP for participation in ADLs/IADLs  Short Term Goal 5: Follow 75% of 2-step commands with appropriate initiation and sequencing to improve functional independence       Therapy Time   Individual Concurrent Group Co-treatment   Time In 1014         Time Out 1037         Minutes 23         Timed Code Treatment Minutes: 9 Minutes       Cain Rodriguez OTR/L

## 2022-06-18 NOTE — ED NOTES
Dr. Gary Potter returned call.     .Electronically signed by Elsie Mckenzie RN on 6/17/2022 at 11:16 PM     Elsie Mckenzie RN  06/17/22 [id number]

## 2022-06-18 NOTE — ED NOTES
Call center called back stating Dr. Rosalinda Carias is on call and will be the provider returning the page. Will await return call.     .Electronically signed by Sophie Shultz RN on 6/17/2022 at 10:45 PM     Sophie Shultz RN  06/17/22 [id number]

## 2022-06-18 NOTE — ED NOTES
RN paged Dr. Delores Guzman with [facility] due to this provider operating on patient on 5/23/2022. Will await return call.     .Electronically signed by Edward Lau RN on 6/17/2022 at 10:41 PM     Edward Lau RN  06/17/22 [id number]

## 2022-06-18 NOTE — ED NOTES
Anthony Sevilla, ALICIA returned call and RN gave her patients info and NP stated that the fellow on call should have been paged. She will notify call center so the correct provider is paged. Will await return call.     Electronically signed by Bruno Edwards RN on 6/17/2022 at 10:49 PM     Bruno Edwards RN  06/17/22 [id number]

## 2022-06-18 NOTE — PROGRESS NOTES
Physical Therapy  Facility/Department: [facility] MED SURG ICU  Physical Therapy Initial Assessment    Name: Timbo Kumar  : 1945  MRN: [id number]  Date of Service: 2022  Chief Complaint   Patient presents with    Shortness of Breath     shortness of breath started this morning was getting readings 85% O2        Discharge Recommendations:  Patient would benefit from continued therapy after discharge   PT Equipment Recommendations  Equipment Needed: No  Other: Pt owns a SPC and RW- writer would recommend use of RW at all times. Patient Diagnosis(es): The encounter diagnosis was Congestive heart failure, unspecified HF chronicity, unspecified heart failure type ([address] Past Medical History:  has a past medical history of Atrial fibrillation ([facility] Chronic combined systolic and diastolic congestive heart failure ([address] Chronic kidney disease, Coronary artery disease involving native coronary artery of native heart without angina pectoris, Diabetes ([facility] [address] [facility] and Hypertension. Past Surgical History:  has a past surgical history that includes Coronary artery bypass graft. Assessment   Body Structures, Functions, Activity Limitations Requiring Skilled Therapeutic Intervention: Decreased functional mobility ; Decreased strength;Decreased endurance;Decreased balance;Decreased safe awareness  Assessment: Pt with impaired mobility requiring mostly CGA except with one LOB requiring mod A when using SPC to ambulate. Pt would benefit from use of RW at all times- which he already owns. Pt will benefit from wife / assistance and would benefit from further therapy at discharge to increase his safety and independence with mobility.   Therapy Prognosis: Good  Decision Making: Medium Complexity  Requires PT Follow-Up: Yes  Activity Tolerance  Activity Tolerance: Patient limited by fatigue     Plan   Plan  Plan:  (5-6x)  Current Treatment Recommendations: Strengthening,Functional mobility training,Balance training,Transfer training,Endurance training,Gait training,Stair training,Home exercise program,Safety education & training,Patient/Caregiver education & training,Therapeutic activities  Safety Devices  Type of Devices: Call light within reach,Chair alarm in place,Left in chair,Nurse notified  Restraints  Restraints Initially in Place: No     Restrictions  Restrictions/Precautions  Restrictions/Precautions: Fall Risk  Required Braces or Orthoses?: No  Position Activity Restriction  Other position/activity restrictions: up with assistance, 2 weeks postop CABG     Subjective   General  Patient assessed for rehabilitation services?: Yes  Response To Previous Treatment: Not applicable  Family / Caregiver Present: Yes (wife)  Follows Commands: Within Functional Limits  Other (Comment): Increased time required  Subjective  Subjective: Pt supine in bed and agreeable to therapy this morning. RN agreeable to therapy. Pt denies any pain at this time. Pt notes he is just very tired. Social/Functional History  Social/Functional History  Lives With: Spouse  Type of Home: House  Home Layout: Two level,Able to Live on Main level with bedroom/bathroom  Home Access: Stairs to enter without rails  Entrance Stairs - Number of Steps: 4  Bathroom Shower/Tub: Tub/Shower unit  Bathroom Toilet: Standard  Home Equipment: Cane,Walker, rolling (typically uses cane within home and RW for community distances; pt's wife reports working on approval for a w/c)  ADL Assistance: Independent  Homemaking Responsibilities: No (wife completes)  Ambulation Assistance: Independent  Transfer Assistance: Independent  Active : Yes (pt reports not driving since sx; wife currently drives)  Mode of Transportation: Car  Occupation: Retired  Type of Occupation: teacher, college (math)  Leisure & Hobbies: play Korbit  Additional Comments: Pt reports going to [address] prior to sx; receives home nursing currently.  Wife is available 24/7 if needed  Vision/Hearing  Vision  Vision: Impaired  Vision Exceptions: Wears glasses for reading  Hearing  Hearing: Exceptions to [facility]  Hearing Exceptions: Hard of hearing/hearing concerns    Cognition   Orientation  Overall Orientation Status: Within Functional Limits  Cognition  Overall Cognitive Status: Exceptions  Following Commands: Follows one step commands with increased time; Follows one step commands with repetition  Attention Span: Attends with cues to redirect  Safety Judgement: Decreased awareness of need for assistance;Decreased awareness of need for safety  Problem Solving: Assistance required to identify errors made;Assistance required to correct errors made  Insights: Decreased awareness of deficits  Initiation: Requires cues for some     Objective      Gross Assessment  Sensation: Impaired (Pt reports numbness/tingling in left hand digits 4&5; chronic in nature)     AROM RLE (degrees)  RLE AROM: WFL  AROM LLE (degrees)  LLE AROM : WFL  Strength RLE  Comment: grossly 4/5 demonstrated during mobility; not formally assessed  Strength LLE  Comment: grossly 4/5 demonstrated during mobility; not formally assessed  Strength RUE  Comment: co evaluation with OT- see OT note for full UE assessment; not formally assessed as pt is s/p CABG 2 weeks ago  Strength LUE  Comment: co evaluation with OT- see OT note for full UE assessment; not formally assessed as pt is s/p CABG 2 weeks ago        Bed mobility  Supine to Sit: Contact guard assistance (HOB raised 40 degrees, use of bed rail; increased processing time, increased effort)  Sit to Supine:  (retired to chair at end of session)  Scooting: Contact guard assistance  Transfers  Sit to Stand: Contact guard assistance  Stand to sit: Contact guard assistance  Comment: Mild unsteady upon standing  Ambulation  Surface: level tile  Device: Single point cane  Assistance: Contact guard assistance; Moderate assistance  Quality of Gait: CGA mostly with two small LOBs requiring just continued CGA and one large posterior LOB requiring mod A to correct, narrow CHRISTOPHER, decreased safety awareness overall  Gait Deviations: Slow Claire;Decreased step length;Decreased step height;Shuffles;Staggers  Distance: 30ft within room only at pt's wife's request  Comments: Ambulated on room air- oxygen saturation 89-95% during gait today.  Pt on 2L O2 upon arrival and respiratory agreeable to attempting on room air as oxygen was 96% upon arrival.  More Ambulation?: No  Stairs/Curb  Stairs?: No     Balance  Posture: Fair  Sitting - Static: Good;-  Sitting - Dynamic: Good;-  Standing - Static: Fair  Standing - Dynamic: Fair  Comments: standing balance assessed with SPC             AM-PAC Score  AM-PAC Inpatient Mobility Raw Score : 16 (06/18/22 1259)  AM-PAC Inpatient T-Scale Score : 40.78 (06/18/22 1259)  Mobility Inpatient CMS 0-100% Score: 54.16 (06/18/22 1259)  Mobility Inpatient CMS G-Code Modifier : CK (06/18/22 1259)          Goals  Short Term Goals  Time Frame for Short term goals: 14 visits  Short term goal 1: Pt to ambulate 300ft modified independently with least restrictive device  Short term goal 2: Pt to sit <> stand transfer independently  Short term goal 3: Pt to demonstrate independent bed mobility  Short term goal 4: Pt to ascend/descend 4 steps CGA without rails to allow for entrance into the home  Short term goal 5: Pt to demonstrate good standing balance to decrease risk of falls       Education  Patient Education  Education Given To: Patient  Education Provided: Role of Therapy;Plan of Care  Education Method: Verbal  Barriers to Learning: None  Education Outcome: Verbalized understanding      Therapy Time   Individual Concurrent Group Co-treatment   Time In 1014         Time Out 1037         Minutes 23         Timed Code Treatment Minutes: [facility], PT

## 2022-06-19 LAB
ANION GAP SERPL CALCULATED.3IONS-SCNC: 10 MMOL/L (ref 9–17)
BUN BLDV-MCNC: 21 MG/DL (ref 8–23)
CALCIUM SERPL-MCNC: 8.8 MG/DL (ref 8.6–10.4)
CHLORIDE BLD-SCNC: 102 MMOL/L (ref 98–107)
CO2: 25 MMOL/L (ref 20–31)
CREAT SERPL-MCNC: 0.96 MG/DL (ref 0.7–1.2)
GFR AFRICAN AMERICAN: >60 ML/MIN
GFR NON-AFRICAN AMERICAN: >60 ML/MIN
GFR SERPL CREATININE-BSD FRML MDRD: ABNORMAL ML/MIN/{1.73_M2}
GLUCOSE BLD-MCNC: 110 MG/DL (ref 70–99)
GLUCOSE BLD-MCNC: 165 MG/DL (ref 75–110)
GLUCOSE BLD-MCNC: 177 MG/DL (ref 75–110)
GLUCOSE BLD-MCNC: 185 MG/DL (ref 75–110)
MAGNESIUM: 2.2 MG/DL (ref 1.6–2.6)
POTASSIUM SERPL-SCNC: 4.2 MMOL/L (ref 3.7–5.3)
PRO-BNP: 1090 PG/ML
SODIUM BLD-SCNC: 137 MMOL/L (ref 135–144)
TSH SERPL DL<=0.05 MIU/L-ACNC: 1.4 UIU/ML (ref 0.3–5)

## 2022-06-19 PROCEDURE — 83735 ASSAY OF MAGNESIUM: CPT

## 2022-06-19 PROCEDURE — 84443 ASSAY THYROID STIM HORMONE: CPT

## 2022-06-19 PROCEDURE — 82947 ASSAY GLUCOSE BLOOD QUANT: CPT

## 2022-06-19 PROCEDURE — 6370000000 HC RX 637 (ALT 250 FOR IP): Performed by: NURSE PRACTITIONER

## 2022-06-19 PROCEDURE — 80048 BASIC METABOLIC PNL TOTAL CA: CPT

## 2022-06-19 PROCEDURE — 6370000000 HC RX 637 (ALT 250 FOR IP): Performed by: INTERNAL MEDICINE

## 2022-06-19 PROCEDURE — 2060000000 HC ICU INTERMEDIATE R&B

## 2022-06-19 PROCEDURE — 83880 ASSAY OF NATRIURETIC PEPTIDE: CPT

## 2022-06-19 PROCEDURE — 2580000003 HC RX 258: Performed by: NURSE PRACTITIONER

## 2022-06-19 PROCEDURE — 6360000002 HC RX W HCPCS: Performed by: NURSE PRACTITIONER

## 2022-06-19 PROCEDURE — 36415 COLL VENOUS BLD VENIPUNCTURE: CPT

## 2022-06-19 PROCEDURE — 99232 SBSQ HOSP IP/OBS MODERATE 35: CPT | Performed by: INTERNAL MEDICINE

## 2022-06-19 RX ORDER — INSULIN GLARGINE 100 [IU]/ML
15 INJECTION, SOLUTION SUBCUTANEOUS NIGHTLY
Status: DISCONTINUED | OUTPATIENT
Start: 2022-06-20 | End: 2022-06-20 | Stop reason: HOSPADM

## 2022-06-19 RX ORDER — FUROSEMIDE 10 MG/ML
20 INJECTION INTRAMUSCULAR; INTRAVENOUS 2 TIMES DAILY
Status: DISCONTINUED | OUTPATIENT
Start: 2022-06-19 | End: 2022-06-20

## 2022-06-19 RX ADMIN — SODIUM CHLORIDE, PRESERVATIVE FREE 10 ML: 5 INJECTION INTRAVENOUS at 20:49

## 2022-06-19 RX ADMIN — INSULIN LISPRO 1 UNITS: 100 INJECTION, SOLUTION INTRAVENOUS; SUBCUTANEOUS at 20:40

## 2022-06-19 RX ADMIN — METOPROLOL TARTRATE 25 MG: 25 TABLET, FILM COATED ORAL at 09:04

## 2022-06-19 RX ADMIN — INSULIN LISPRO 2 UNITS: 100 INJECTION, SOLUTION INTRAVENOUS; SUBCUTANEOUS at 12:24

## 2022-06-19 RX ADMIN — POLYETHYLENE GLYCOL 3350 17 G: 17 POWDER, FOR SOLUTION ORAL at 09:04

## 2022-06-19 RX ADMIN — ASPIRIN 81 MG CHEWABLE TABLET 81 MG: 81 TABLET CHEWABLE at 09:04

## 2022-06-19 RX ADMIN — INSULIN LISPRO 2 UNITS: 100 INJECTION, SOLUTION INTRAVENOUS; SUBCUTANEOUS at 17:36

## 2022-06-19 RX ADMIN — EMPAGLIFLOZIN 10 MG: 10 TABLET, FILM COATED ORAL at 09:05

## 2022-06-19 RX ADMIN — ATORVASTATIN CALCIUM 80 MG: 40 TABLET, FILM COATED ORAL at 20:39

## 2022-06-19 RX ADMIN — SODIUM CHLORIDE, PRESERVATIVE FREE 10 ML: 5 INJECTION INTRAVENOUS at 09:04

## 2022-06-19 RX ADMIN — FUROSEMIDE 40 MG: 10 INJECTION, SOLUTION INTRAMUSCULAR; INTRAVENOUS at 09:04

## 2022-06-19 RX ADMIN — LISINOPRIL 5 MG: 5 TABLET ORAL at 09:04

## 2022-06-19 NOTE — PLAN OF CARE
Problem: Discharge Planning  Goal: Discharge to home or other facility with appropriate resources  6/18/2022 2056 by Kae James RN  Outcome: Progressing  Flowsheets (Taken 6/18/2022 1644 by Suzanne Tilley RN)  Discharge to home or other facility with appropriate resources:   Identify barriers to discharge with patient and caregiver   Identify discharge learning needs (meds, wound care, etc)     Problem: Pain  Goal: Verbalizes/displays adequate comfort level or baseline comfort level  6/18/2022 2056 by Kae James RN  Outcome: Progressing     Problem: Safety - Adult  Goal: Free from fall injury  6/18/2022 2056 by Kae James RN  Outcome: Progressing  Flowsheets (Taken 6/18/2022 0800)  Free From Fall Injury: Instruct family/caregiver on patient safety     Problem: ABCDS Injury Assessment  Goal: Absence of physical injury  6/18/2022 2056 by Kae James RN  Outcome: Progressing  Flowsheets (Taken 6/18/2022 0800)  Absence of Physical Injury: Implement safety measures based on patient assessment

## 2022-06-19 NOTE — PROGRESS NOTES
[facility]  Office: [address], , Shaun Terence, DO, Colin Ferrara, DO, Val Richter Blood, DO, Sobia Peters MD, Viki Torres MD, Radha Gonzalez MD, Janell Espinoza MD, Harpreet Juan MD, Carlos A Osborne MD, Denilson Alexandre MD, Victoriano Matute, DO, Radha Garduno MD,  Su Knight, DO, Denise Urias MD, Santana Gambino MD, Franny Parry DO, Ghislaine Stanley MD, Cuco Akers MD, Alfred Gavin, DO, Kristen Frank MD, Heather Lucero MD, Clive Marquis [facility], [facility], CNP, Kuldeep Ornelas CNP, Reginaldo Levine, CNP, Marie Penaloza, CNP, Ashanti Marte CNP, DARCI SchulerC, Glory Kelley, [facility], Yocasta Sanon, CNP, Rodriguez Monae, CNP, Ian Farris, CNP, Albina Yepez, CNS, Irwin Benitez, [facility], Jessica Gamboa, CNP, Francoise Mann, [facility], [address]    Progress Note    6/19/2022    11:32 AM    Name:   Yaneth Middleton  MRN:     [id number]     Acct:      [de-identified]   Room:   [address] [place] [address] Day:  1  Admit Date:  6/17/2022  9:05 PM    PCP:   No primary care provider on file. Code Status:  Full Code    Subjective:     C/C:   Chief Complaint   Patient presents with    Shortness of Breath     shortness of breath started this morning was getting readings 85% O2      Interval History Status: not changed. Feels about the same  o2 sat still drops to mid to high 80s on ra with movement to bathroom  At rest, 91% on ra    Sob about the same  Denies cp/n/v    Brief History:     Yaneth Middleton is a 69 y.o. Non- / non  male who presents with Shortness of Breath (shortness of breath started this morning was getting readings 85% O2 )   and is admitted to the hospital for the management of Acute on chronic combined systolic and diastolic congestive heart failure ([facility] [address]    This 69 yom presents with sob and flores along with reduced energy level.   He had cabg 1 vessel at [facility] 5/23 and was seen in followup there on 6/10 at which time his demadex was stopped. He has had a little cough but mainly the sob. His o2 sat at home was 84% on ra. He does have o2 tank at home (not prescribed, just something he bought a few years ago). Not clear when he uses it or why, hasn't used it in few months and implied it no longer works. Review of Systems:     Constitutional:  negative for chills, fevers, sweats  Respiratory:  negative for cough,wheezing  Cardiovascular:  negative for chest pain, chest pressure/discomfort, lower extremity edema, palpitations  Gastrointestinal:  negative for abdominal pain, constipation, diarrhea, nausea, vomiting  Neurological:  negative for dizziness, headache    Medications: Allergies: Allergies   Allergen Reactions    Penicillins Rash       Current Meds:   Scheduled Meds:    aspirin  81 mg Oral Daily    lisinopril  5 mg Oral Daily    metoprolol tartrate  25 mg Oral Daily    polyethylene glycol  17 g Oral Daily    sodium chloride flush  5-40 mL IntraVENous 2 times per day    enoxaparin  40 mg SubCUTAneous Daily    furosemide  40 mg IntraVENous BID    insulin glargine  25 Units SubCUTAneous Nightly    atorvastatin  80 mg Oral Nightly    insulin lispro  0-12 Units SubCUTAneous TID WC    insulin lispro  0-6 Units SubCUTAneous Nightly    empagliflozin  10 mg Oral Daily     Continuous Infusions:    sodium chloride      dextrose       PRN Meds: sodium chloride flush, sodium chloride, ondansetron **OR** ondansetron, polyethylene glycol, acetaminophen **OR** acetaminophen, glucose, dextrose bolus **OR** dextrose bolus, glucagon (rDNA), dextrose    Data:     Past Medical History:   has a past medical history of Atrial fibrillation ([facility] [address] Chronic combined systolic and diastolic congestive heart failure (HCC), Chronic kidney disease, Coronary artery disease involving native coronary artery of native heart without angina pectoris, Diabetes ([facility] [address] and Hypertension.     Social History:   reports that he has quit smoking. He has never used smokeless tobacco. He reports previous alcohol use. He reports that he does not use drugs. Family History:   Family History   Problem Relation Age of Onset    Diabetes Father     Diabetes Brother        Vitals:  BP (!) 96/54   Pulse 68   Temp 98.2 °F (36.8 °C)   Resp 16   Ht 5' 4\" (1.626 m)   Wt 136 lb 3.9 oz (61.8 kg)   SpO2 92%   BMI 23.39 kg/m²   Temp (24hrs), Av.4 °F (36.9 °C), Min:98.1 °F (36.7 °C), Max:98.8 °F (37.1 °C)    Recent Labs     22  0823 22  1218 22  16322   POCGLU 145* 234* 93 167*       I/O (24Hr):     Intake/Output Summary (Last 24 hours) at 2022 1132  Last data filed at 2022 0629  Gross per 24 hour   Intake 180 ml   Output 275 ml   Net -95 ml       Labs:  Hematology:  Recent Labs     22   WBC 6.7   RBC 4.77   HGB 14.2   HCT 43.1   MCV 90.3   MCH 29.8   MCHC 33.0   RDW 16.3*      MPV 8.8   INR 1.0     Chemistry:  Recent Labs     22  0151 22  0638 22  0535     --  133* 137   K 5.4*  --  4.1 4.2     --  99 102   CO2 25  --  22 25   GLUCOSE 178*  --  187* 110*   BUN 14  --  14 21   CREATININE 0.86  --  0.77 0.96   MG  --   --   --  2.2   ANIONGAP 11  --  12 10   LABGLOM >60  --  >60 >60   GFRAA >60  --  >60 >60   CALCIUM 9.7  --  8.4* 8.8   PROBNP 2,417*  --   --  1,090*   TROPHS 42* 40* 41*  --      Recent Labs     22  0822  1218 22  1630 22  0535   TSH  --   --   --   --  1.40   POCGLU 145* 234* 93 167*  --      ABG:No results found for: POCPH, PHART, PH, POCPCO2, FIC8NGQ, PCO2, POCPO2, PO2ART, PO2, POCHCO3, RJD1XJR, HCO3, NBEA, PBEA, BEART, BE, THGBART, THB, ENG8GFA, WYXG6QGD, Y1YQYGEH, O2SAT, FIO2  No results found for: SPECIAL  No results found for: CULTURE    Radiology:  XR CHEST (2 VW)    Result Date: 2022  Mildly improved pulmonary vascular congestion     XR CHEST

## 2022-06-19 NOTE — PROGRESS NOTES
Nutrition Note    Consult received.  Will see patient 6/20/2022    Electronically signed by Rito Rose RD on 6/19/22 at 5:24 PM EDT    ADA Toure, RDN, LD  Registered [address]  [phone]

## 2022-06-20 VITALS
WEIGHT: 62 LBS | DIASTOLIC BLOOD PRESSURE: 53 MMHG | RESPIRATION RATE: 16 BRPM | OXYGEN SATURATION: 93 % | SYSTOLIC BLOOD PRESSURE: 103 MMHG | TEMPERATURE: 98.4 F | BODY MASS INDEX: 10.58 KG/M2 | HEART RATE: 87 BPM | HEIGHT: 64 IN

## 2022-06-20 LAB — GLUCOSE BLD-MCNC: 204 MG/DL (ref 75–110)

## 2022-06-20 PROCEDURE — 6370000000 HC RX 637 (ALT 250 FOR IP): Performed by: NURSE PRACTITIONER

## 2022-06-20 PROCEDURE — 2580000003 HC RX 258: Performed by: NURSE PRACTITIONER

## 2022-06-20 PROCEDURE — 94618 PULMONARY STRESS TESTING: CPT

## 2022-06-20 PROCEDURE — 6370000000 HC RX 637 (ALT 250 FOR IP): Performed by: INTERNAL MEDICINE

## 2022-06-20 PROCEDURE — 82947 ASSAY GLUCOSE BLOOD QUANT: CPT

## 2022-06-20 PROCEDURE — 2700000000 HC OXYGEN THERAPY PER DAY

## 2022-06-20 PROCEDURE — 99239 HOSP IP/OBS DSCHRG MGMT >30: CPT | Performed by: INTERNAL MEDICINE

## 2022-06-20 RX ORDER — FUROSEMIDE 20 MG/1
20 TABLET ORAL DAILY
Qty: 60 TABLET | Refills: 3 | Status: SHIPPED | OUTPATIENT
Start: 2022-06-20 | End: 2022-06-20

## 2022-06-20 RX ORDER — CHOLECALCIFEROL (VITAMIN D3) 125 MCG
5 CAPSULE ORAL NIGHTLY PRN
Status: DISCONTINUED | OUTPATIENT
Start: 2022-06-20 | End: 2022-06-20 | Stop reason: HOSPADM

## 2022-06-20 RX ORDER — FUROSEMIDE 20 MG/1
20 TABLET ORAL DAILY
Qty: 30 TABLET | Refills: 3 | Status: SHIPPED | OUTPATIENT
Start: 2022-06-20

## 2022-06-20 RX ORDER — FUROSEMIDE 20 MG/1
20 TABLET ORAL DAILY
Status: DISCONTINUED | OUTPATIENT
Start: 2022-06-20 | End: 2022-06-20 | Stop reason: HOSPADM

## 2022-06-20 RX ADMIN — METOPROLOL TARTRATE 25 MG: 25 TABLET, FILM COATED ORAL at 08:33

## 2022-06-20 RX ADMIN — POLYETHYLENE GLYCOL 3350 17 G: 17 POWDER, FOR SOLUTION ORAL at 08:44

## 2022-06-20 RX ADMIN — INSULIN LISPRO 2 UNITS: 100 INJECTION, SOLUTION INTRAVENOUS; SUBCUTANEOUS at 08:32

## 2022-06-20 RX ADMIN — LISINOPRIL 5 MG: 5 TABLET ORAL at 08:34

## 2022-06-20 RX ADMIN — FUROSEMIDE 20 MG: 20 TABLET ORAL at 12:24

## 2022-06-20 RX ADMIN — EMPAGLIFLOZIN 10 MG: 10 TABLET, FILM COATED ORAL at 08:32

## 2022-06-20 RX ADMIN — ASPIRIN 81 MG CHEWABLE TABLET 81 MG: 81 TABLET CHEWABLE at 08:32

## 2022-06-20 RX ADMIN — SODIUM CHLORIDE, PRESERVATIVE FREE 10 ML: 5 INJECTION INTRAVENOUS at 08:34

## 2022-06-20 NOTE — PROGRESS NOTES
RT in to perform Home 02 eval . Upon entering room , it was noted that pt was on room air . I did spot check and sat was revealed at 93% . We did do the 6 minute walk , Rn did accompany us as pt was wobbly on feet . During walk this sat improved to 95-96% . At this time he does not meet qualification to receive 02 at home .

## 2022-06-20 NOTE — PROGRESS NOTES
[facility]  Office: [address], DO, Wilberallison Martinez, DO, Inderjit Elzbieta, DO, Alyssia Dragan Luu, DO, Kane Haro MD, Elizabeth Morgan MD, Rosalina Ruiz MD, Kristi Lanier MD, Beryle Marseille, MD, Ciro Pallas, MD, Christian Ron MD, Maggy Mcbride, DO, Donald Vasques MD,  Robert Moore, DO, Alaina Ogden MD, Zaria Arce MD, Stephany Read, DO, Layo Thornton MD, Noemy Baker MD, Eb [facility], DO, Amee Baird MD, Maddy Chapman MD, K[address], [facility], [facility], CNP, Matthew Green, CNP, Jessica Mei, CNP, German Ponce, CNP, Armin Og, CNP, Danilo Shankar PA-C, Mortimer Guardian, DNP, Mago Kimball, CNP, Venkat Sage, CNP, Teresa Bean, CNP, Virgil Bruce, CNS, Lavonne Frankel, [facility], Emily Gallegos, CNP, Barak Doyle, CNP, [place] [facility], [address]    Progress Note    6/20/2022    10:21 AM    Name:   Juancarlos Casiano  MRN:     [id number]     Acct:      [de-identified]   Room:   [address] [place] [address] Day:  2  Admit Date:  6/17/2022  9:05 PM    PCP:   No primary care provider on file. Code Status:  Full Code    Subjective:     C/C:   Chief Complaint   Patient presents with    Shortness of Breath     shortness of breath started this morning was getting readings 85% O2      Interval History Status: improved. Feels better  Per wife, o2 sat still drops to mid to high 80s on ra with movement to bathroom  At rest, 93% on ra    Sob about the same  Denies cp/n/v    Refused labs and lasix this am    Brief History:     Juancarlos Casiano is a 69 y.o. Non- / non  male who presents with Shortness of Breath (shortness of breath started this morning was getting readings 85% O2 )   and is admitted to the hospital for the management of Acute on chronic combined systolic and diastolic congestive heart failure ([facility] [address]    This 69 yom presents with sob and flores along with reduced energy level.   He [facility] and was seen in followup there on 6/10 at which time his demadex was stopped. He has had a little cough but mainly the sob. His o2 sat at home was 84% on ra. He does have o2 tank at home (not prescribed, just something he bought a few years ago). Not clear when he uses it or why, hasn't used it in few months and implied it no longer works. Review of Systems:     Constitutional:  negative for chills, fevers, sweats  Respiratory:  negative for cough,wheezing  Cardiovascular:  negative for chest pain, chest pressure/discomfort, lower extremity edema, palpitations  Gastrointestinal:  negative for abdominal pain, constipation, diarrhea, nausea, vomiting  Neurological:  negative for dizziness, headache    Medications: Allergies:     Allergies   Allergen Reactions    Penicillins Rash       Current Meds:   Scheduled Meds:    furosemide  20 mg IntraVENous BID    insulin glargine  15 Units SubCUTAneous Nightly    aspirin  81 mg Oral Daily    lisinopril  5 mg Oral Daily    metoprolol tartrate  25 mg Oral Daily    polyethylene glycol  17 g Oral Daily    sodium chloride flush  5-40 mL IntraVENous 2 times per day    enoxaparin  40 mg SubCUTAneous Daily    atorvastatin  80 mg Oral Nightly    insulin lispro  0-12 Units SubCUTAneous TID WC    insulin lispro  0-6 Units SubCUTAneous Nightly    empagliflozin  10 mg Oral Daily     Continuous Infusions:    sodium chloride      dextrose       PRN Meds: melatonin, sodium chloride flush, sodium chloride, ondansetron **OR** ondansetron, polyethylene glycol, acetaminophen **OR** acetaminophen, glucose, dextrose bolus **OR** dextrose bolus, glucagon (rDNA), dextrose    Data:     Past Medical History:   has a past medical history of Atrial fibrillation ([address] Chronic combined systolic and diastolic congestive heart failure (HCC), Chronic kidney disease, Coronary artery disease involving native coronary artery of native heart without angina pectoris, Diabetes ([address] and Hypertension. Social History:   reports that he has quit smoking. He has never used smokeless tobacco. He reports previous alcohol use. He reports that he does not use drugs. Family History:   Family History   Problem Relation Age of Onset    Diabetes Father     Diabetes Brother        Vitals:  BP (!) 103/53   Pulse 87   Temp 98.4 °F (36.9 °C) (Oral)   Resp 16   Ht 5' 4\" (1.626 m)   Wt 62 lb (28.1 kg)   SpO2 93%   BMI 10.64 kg/m²   Temp (24hrs), Av.5 °F (36.9 °C), Min:98 °F (36.7 °C), Max:99 °F (37.2 °C)    Recent Labs     22  1206 22  1626 227 22  0823   POCGLU 185* 177* 165* 204*       I/O (24Hr):     Intake/Output Summary (Last 24 hours) at 2022 1021  Last data filed at 2022 0444  Gross per 24 hour   Intake 650 ml   Output 1600 ml   Net -950 ml       Labs:  Hematology:  Recent Labs     22   WBC 6.7   RBC 4.77   HGB 14.2   HCT 43.1   MCV 90.3   MCH 29.8   MCHC 33.0   RDW 16.3*      MPV 8.8   INR 1.0     Chemistry:  Recent Labs     22  0151 22  0638 22  0535     --  133* 137   K 5.4*  --  4.1 4.2     --  99 102   CO2 25  --  22 25   GLUCOSE 178*  --  187* 110*   BUN 14  --  14 21   CREATININE 0.86  --  0.77 0.96   MG  --   --   --  2.2   ANIONGAP 11  --  12 10   LABGLOM >60  --  >60 >60   GFRAA >60  --  >60 >60   CALCIUM 9.7  --  8.4* 8.8   PROBNP 2,417*  --   --  1,090*   TROPHS 42* 40* 41*  --      Recent Labs     22  1630 22  0535 22  1206 22  1626 22  0823   TSH  --   --  1.40  --   --   --   --    POCGLU 93 167*  --  185* 177* 165* 204*     ABG:No results found for: POCPH, PHART, PH, POCPCO2, GQD8WVK, PCO2, POCPO2, PO2ART, PO2, POCHCO3, UQO7PRU, HCO3, NBEA, PBEA, BEART, BE, THGBART, THB, QKM6ITP, SEGV4YKE, U2QLJHQF, O2SAT, FIO2  No results found for: SPECIAL  No results found for: CULTURE    Radiology:  XR CHEST (2 VW)    Result Date: 6/18/2022  Mildly improved pulmonary vascular congestion     XR CHEST PORTABLE    Result Date: 6/17/2022  Findings compatible with interstitial edema with small pleural effusions. Physical Examination:       General appearance:  alert, cooperative and no distress  Mental Status:  oriented to person, place and time and normal affect  Lungs:  clear bilaterally, normal effort  Heart:  regular rate and rhythm, no murmur  Abdomen:  soft, nontender, nondistended, normal bowel sounds, no masses, hepatomegaly, splenomegaly  Extremities:  no edema, redness, tenderness in the calves  Skin:  no gross lesions, rashes, induration    Assessment:     Hospital Problems           Last Modified POA    * (Principal) Acute on chronic combined systolic and diastolic congestive heart failure ([facility] [address] 6/18/2022 Yes    Coronary artery disease involving native coronary artery of native heart without angina pectoris 6/18/2022 Yes    Paroxysmal atrial fibrillation ([address] 6/18/2022 Yes    Type 2 diabetes mellitus without complication, with long-term current use of insulin ([facility] [address] 6/18/2022 Yes    Primary hypertension 6/18/2022 Yes    Hyperkalemia 6/18/2022 Yes          Plan:     1. Change to po lasix: he refused iv lasix this am  2. Refused labs today  3. Monitor o2 sats; home o2 eval  4.  D/w wife and son    Ilya Wong Blood, DO  6/20/2022  10:21 AM

## 2022-06-20 NOTE — PLAN OF CARE
Problem: Discharge Planning  Goal: Discharge to home or other facility with appropriate resources  6/20/2022 1038 by Willy Tse RN  Outcome: Completed     Problem: Pain  Goal: Verbalizes/displays adequate comfort level or baseline comfort level  6/20/2022 1038 by Willy Tse RN  Outcome: Completed     Problem: Safety - Adult  Goal: Free from fall injury  6/20/2022 1038 by Willy Tse RN  Outcome: Completed     Problem: ABCDS Injury Assessment  Goal: Absence of physical injury  6/20/2022 1038 by Willy Tse RN  Outcome: Completed     Problem: Skin/Tissue Integrity  Goal: Absence of new skin breakdown  Description: 1. Monitor for areas of redness and/or skin breakdown  2. Assess vascular access sites hourly  3. Every 4-6 hours minimum:  Change oxygen saturation probe site  4. Every 4-6 hours:  If on nasal continuous positive airway pressure, respiratory therapy assess nares and determine need for appliance change or resting period.   6/20/2022 1038 by Willy Tse RN  Outcome: Completed

## 2022-06-20 NOTE — DISCHARGE SUMMARY
diastolic congestive heart failure ([facility] [address] , presented to ER with Shortness of Breath (shortness of breath started this morning was getting readings 85% O2 )    Admitted with sob, chf exacerbation and hypoxia to 84% at home, especially with activity. Had recently had his torsemide stopped as an outpatient. Was diuresed with lasix and chf improved. Tested for home o2 but did not qualify for home o2 (93-96% on ra, even with activity)      Significant therapeutic interventions: see above    Significant Diagnostic Studies:   Labs / Micro:  CBC:   Lab Results   Component Value Date    WBC 6.7 06/17/2022    RBC 4.77 06/17/2022    HGB 14.2 06/17/2022    HCT 43.1 06/17/2022    MCV 90.3 06/17/2022    MCH 29.8 06/17/2022    MCHC 33.0 06/17/2022    RDW 16.3 06/17/2022     06/17/2022     CMP:    Lab Results   Component Value Date    GLUCOSE 110 06/19/2022     06/19/2022    K 4.2 06/19/2022     06/19/2022    CO2 25 06/19/2022    BUN 21 06/19/2022    CREATININE 0.96 06/19/2022    ANIONGAP 10 06/19/2022    LABGLOM >60 06/19/2022    GFRAA >60 06/19/2022    GFR      06/19/2022    CALCIUM 8.8 06/19/2022        Radiology:  XR CHEST (2 VW)    Result Date: 6/18/2022  Mildly improved pulmonary vascular congestion     XR CHEST PORTABLE    Result Date: 6/17/2022  Findings compatible with interstitial edema with small pleural effusions. Consultations:    Consults:     Final Specialist Recommendations/Findings:   IP CONSULT TO HEART FAILURE NURSE/COORDINATOR  IP CONSULT TO DIETITIAN      The patient was seen and examined on day of discharge and this discharge summary is in conjunction with any daily progress note from day of discharge.     Discharge plan:     Disposition: Home    Physician Follow Up:     pcp    In 1 week      cardiology    In 2 weeks      Romeo Rapp  [address]  [address]  [phone]    Call in 1 week  For follow-up       Requiring Further Evaluation/Follow Up POST HOSPITALIZATION/Incidental Findings: monitor elytes and renal function    Diet: cardiac diet    Activity: As tolerated    Instructions to Patient: take medications as prescribed      Discharge Medications:      Medication List      START taking these medications    furosemide 20 MG tablet  Commonly known as: LASIX  Take 1 tablet by mouth daily        CONTINUE taking these medications    acetaminophen 325 MG tablet  Commonly known as: TYLENOL     aspirin 81 MG chewable tablet     atorvastatin 80 MG tablet  Commonly known as: LIPITOR     Farxiga 10 MG tablet  Generic drug: dapagliflozin     Insulin Degludec 100 UNIT/ML Soln     lisinopril 5 MG tablet  Commonly known as: PRINIVIL;ZESTRIL     magnesium hydroxide 400 MG/5ML suspension  Commonly known as: MILK OF MAGNESIA     metoprolol tartrate 25 MG tablet  Commonly known as: LOPRESSOR     omeprazole 20 MG delayed release capsule  Commonly known as: PRILOSEC     polyethylene glycol 17 g packet  Commonly known as: GLYCOLAX     therapeutic multivitamin-minerals tablet        STOP taking these medications    torsemide 10 MG tablet  Commonly known as: DEMADEX           Where to Get Your Medications      These medications were sent to [facility] [place] [address] [place] [address] [address], [address] [facility] [address]    Phone: [phone]   · furosemide 20 MG tablet         No discharge procedures on file. Time Spent on discharge is  33 mins in patient examination, evaluation, counseling as well as medication reconciliation, prescriptions for required medications, discharge plan and follow up. Electronically signed by   Peter Luu DO  6/20/2022  12:36 PM      Thank you Dr. Roxane Bloch primary care provider on file. for the opportunity to be involved in this patient's care.

## 2022-06-20 NOTE — CONSULTS
Nutrition Education    · Educated on 6/20/2022  · Learners: Patient and Family  · Readiness: Non-acceptance  · Method: Explanation and Handout  · Response: Refused Teaching  · Contact name and number provided. Pt stated he did not need education as he has already received education from [facility] in the past. Encouraged pt to reach out with any questions/concerns.     ADA Wei, RDN, LD  Registered [address] [place], [place] [address] [address]  [phone]

## 2022-06-20 NOTE — PLAN OF CARE
[facility]  Office: [address], DO, Justin Webbers, DO, Virgen Torresint, DO, Renita Wilkins Blood, DO, Terrie Lorenzo MD, Camila Villegas MD, Chris Flaherty MD, Nick Willingham MD, Macrina Valdez MD, Julia Groves MD, Deepali Kilpatrick MD, Monica Pereyra, DO, Sue Reyna MD,  Jesus Magana, DO, Renata Grider MD, Bro Thayer MD, Jeremy Dakins, DO, Lottie Diallo MD, Arabella Strong MD, Lamonte Cooks, DO, Jessica Thompson MD, Ishmael Raman MD, Chris Magana [facility], CNP, Doretha Quigley, CNP, Leatha Beltran, CNP, Hunter Omalley, CNP, Judy Crawley, CNP, Bridget Patrick PA-C, Manuel Rogers, [facility], Jenna Phillips, CNP, Fe Hebert, CNP, Purnima Kyle, CNP, Joe Sagastume, CNS, Favio Vance, [facility], Jose Garnica, CNP, Xena Lennon, CNP, Jarad Floresm, [address]    Second Visit Note  For more detailed information please refer to the progress note of the day      6/20/2022    11:27 AM    Name:   Joey Martinez  MRN:     [id number]     Acct:      [de-identified]   Room:   [address] [place] [address] Day:  2  Admit Date:  6/17/2022  9:05 PM    PCP:   No primary care provider on file. Code Status:  Full Code        Pt vitals were reviewed   New labs were reviewed   Patient was seen    Updated plan :     1. D/w wife, patient and son that he does not need o2 but does need diuretic.   Patient wanted to take it whenever he felt he needed it but I suggest he take it daily for now and then re-eval in future        Cori Landau Blood, DO  6/20/2022  11:27 AM

## 2022-06-20 NOTE — PROGRESS NOTES
Home Oxygen Evaluation    Home Oxygen Evaluation completed. Patient is on Room Air    Resting SpO2 on room air 93 %    SpO2 on room air with exercise = 96%      Nocturnal Oximetry with patient on room air is recommended is SpO2 is between 89% and 95% (requires additional order).     Antionette Richardson RCP  10:59 AM%

## 2022-06-20 NOTE — PROGRESS NOTES
Pt discharged, PIV removed and discharge instructions done with pt family at bedside. Wheelchair to ride.

## 2024-04-16 ENCOUNTER — INPATIENT HOSPITAL (OUTPATIENT)
Dept: URBAN - METROPOLITAN AREA HOSPITAL 50 | Facility: HOSPITAL | Age: 79
End: 2024-04-16
Payer: MEDICARE

## 2024-04-16 DIAGNOSIS — A04.72 ENTEROCOLITIS DUE TO CLOSTRIDIUM DIFFICILE, NOT SPECIFIED AS: ICD-10-CM

## 2024-04-16 DIAGNOSIS — K44.9 DIAPHRAGMATIC HERNIA WITHOUT OBSTRUCTION OR GANGRENE: ICD-10-CM

## 2024-04-16 DIAGNOSIS — D62 ACUTE POSTHEMORRHAGIC ANEMIA: ICD-10-CM

## 2024-04-16 DIAGNOSIS — K92.1 MELENA: ICD-10-CM

## 2024-04-16 DIAGNOSIS — K31.89 OTHER DISEASES OF STOMACH AND DUODENUM: ICD-10-CM

## 2024-04-16 PROCEDURE — 99222 1ST HOSP IP/OBS MODERATE 55: CPT | Performed by: INTERNAL MEDICINE

## 2024-04-17 ENCOUNTER — INPATIENT HOSPITAL (OUTPATIENT)
Dept: URBAN - METROPOLITAN AREA HOSPITAL 50 | Facility: HOSPITAL | Age: 79
End: 2024-04-17
Payer: MEDICARE

## 2024-04-17 DIAGNOSIS — A04.72 ENTEROCOLITIS DUE TO CLOSTRIDIUM DIFFICILE, NOT SPECIFIED AS: ICD-10-CM

## 2024-04-17 DIAGNOSIS — K92.1 MELENA: ICD-10-CM

## 2024-04-17 DIAGNOSIS — D62 ACUTE POSTHEMORRHAGIC ANEMIA: ICD-10-CM

## 2024-04-17 PROCEDURE — 99233 SBSQ HOSP IP/OBS HIGH 50: CPT | Mod: FS | Performed by: NURSE PRACTITIONER

## 2024-04-18 PROCEDURE — 43239 EGD BIOPSY SINGLE/MULTIPLE: CPT | Performed by: INTERNAL MEDICINE

## 2024-04-19 ENCOUNTER — INPATIENT HOSPITAL (OUTPATIENT)
Dept: URBAN - METROPOLITAN AREA HOSPITAL 50 | Facility: HOSPITAL | Age: 79
End: 2024-04-19
Payer: MEDICARE

## 2024-04-19 DIAGNOSIS — K64.4 RESIDUAL HEMORRHOIDAL SKIN TAGS: ICD-10-CM

## 2024-04-19 DIAGNOSIS — K92.1 MELENA: ICD-10-CM

## 2024-04-19 DIAGNOSIS — A04.72 ENTEROCOLITIS DUE TO CLOSTRIDIUM DIFFICILE, NOT SPECIFIED AS: ICD-10-CM

## 2024-04-19 DIAGNOSIS — K64.8 OTHER HEMORRHOIDS: ICD-10-CM

## 2024-04-19 DIAGNOSIS — K57.30 DIVERTICULOSIS OF LARGE INTESTINE WITHOUT PERFORATION OR ABS: ICD-10-CM

## 2024-04-19 DIAGNOSIS — D12.5 BENIGN NEOPLASM OF SIGMOID COLON: ICD-10-CM

## 2024-04-19 PROCEDURE — 45380 COLONOSCOPY AND BIOPSY: CPT | Mod: XS | Performed by: INTERNAL MEDICINE

## 2024-04-19 PROCEDURE — 45385 COLONOSCOPY W/LESION REMOVAL: CPT | Performed by: INTERNAL MEDICINE

## 2024-04-20 ENCOUNTER — INPATIENT HOSPITAL (OUTPATIENT)
Dept: URBAN - METROPOLITAN AREA HOSPITAL 50 | Facility: HOSPITAL | Age: 79
End: 2024-04-20
Payer: MEDICARE

## 2024-04-20 DIAGNOSIS — A04.72 ENTEROCOLITIS DUE TO CLOSTRIDIUM DIFFICILE, NOT SPECIFIED AS: ICD-10-CM

## 2024-04-20 DIAGNOSIS — K64.4 RESIDUAL HEMORRHOIDAL SKIN TAGS: ICD-10-CM

## 2024-04-20 DIAGNOSIS — D62 ACUTE POSTHEMORRHAGIC ANEMIA: ICD-10-CM

## 2024-04-20 DIAGNOSIS — D12.5 BENIGN NEOPLASM OF SIGMOID COLON: ICD-10-CM

## 2024-04-20 DIAGNOSIS — K57.30 DIVERTICULOSIS OF LARGE INTESTINE WITHOUT PERFORATION OR ABS: ICD-10-CM

## 2024-04-20 DIAGNOSIS — K64.8 OTHER HEMORRHOIDS: ICD-10-CM

## 2024-04-20 PROCEDURE — 99233 SBSQ HOSP IP/OBS HIGH 50: CPT | Mod: FS | Performed by: NURSE PRACTITIONER

## 2024-04-22 ENCOUNTER — INPATIENT HOSPITAL (OUTPATIENT)
Dept: URBAN - METROPOLITAN AREA HOSPITAL 50 | Facility: HOSPITAL | Age: 79
End: 2024-04-22
Payer: MEDICARE

## 2024-04-22 DIAGNOSIS — K64.8 OTHER HEMORRHOIDS: ICD-10-CM

## 2024-04-22 DIAGNOSIS — K64.4 RESIDUAL HEMORRHOIDAL SKIN TAGS: ICD-10-CM

## 2024-04-22 DIAGNOSIS — D62 ACUTE POSTHEMORRHAGIC ANEMIA: ICD-10-CM

## 2024-04-22 DIAGNOSIS — K57.30 DIVERTICULOSIS OF LARGE INTESTINE WITHOUT PERFORATION OR ABS: ICD-10-CM

## 2024-04-22 DIAGNOSIS — A04.72 ENTEROCOLITIS DUE TO CLOSTRIDIUM DIFFICILE, NOT SPECIFIED AS: ICD-10-CM

## 2024-04-22 DIAGNOSIS — D12.5 BENIGN NEOPLASM OF SIGMOID COLON: ICD-10-CM

## 2024-04-22 PROCEDURE — 99233 SBSQ HOSP IP/OBS HIGH 50: CPT | Mod: FS

## 2024-04-23 PROCEDURE — 99233 SBSQ HOSP IP/OBS HIGH 50: CPT | Mod: FS | Performed by: NURSE PRACTITIONER

## 2024-04-24 ENCOUNTER — INPATIENT HOSPITAL (OUTPATIENT)
Dept: URBAN - METROPOLITAN AREA HOSPITAL 50 | Facility: HOSPITAL | Age: 79
End: 2024-04-24
Payer: MEDICARE

## 2024-04-24 DIAGNOSIS — K64.8 OTHER HEMORRHOIDS: ICD-10-CM

## 2024-04-24 DIAGNOSIS — A04.72 ENTEROCOLITIS DUE TO CLOSTRIDIUM DIFFICILE, NOT SPECIFIED AS: ICD-10-CM

## 2024-04-24 DIAGNOSIS — D62 ACUTE POSTHEMORRHAGIC ANEMIA: ICD-10-CM

## 2024-04-24 DIAGNOSIS — K57.30 DIVERTICULOSIS OF LARGE INTESTINE WITHOUT PERFORATION OR ABS: ICD-10-CM

## 2024-04-24 PROCEDURE — 99233 SBSQ HOSP IP/OBS HIGH 50: CPT | Mod: FS | Performed by: NURSE PRACTITIONER

## 2024-04-25 ENCOUNTER — INPATIENT HOSPITAL (OUTPATIENT)
Dept: URBAN - METROPOLITAN AREA HOSPITAL 50 | Facility: HOSPITAL | Age: 79
End: 2024-04-25
Payer: MEDICARE

## 2024-04-25 DIAGNOSIS — D64.9 ANEMIA, UNSPECIFIED: ICD-10-CM

## 2024-04-25 DIAGNOSIS — R74.01 ELEVATION OF LEVELS OF LIVER TRANSAMINASE LEVELS: ICD-10-CM

## 2024-04-25 DIAGNOSIS — A04.72 ENTEROCOLITIS DUE TO CLOSTRIDIUM DIFFICILE, NOT SPECIFIED AS: ICD-10-CM

## 2024-04-25 PROCEDURE — 99233 SBSQ HOSP IP/OBS HIGH 50: CPT | Mod: FS | Performed by: NURSE PRACTITIONER

## 2024-04-26 ENCOUNTER — INPATIENT HOSPITAL (OUTPATIENT)
Dept: URBAN - METROPOLITAN AREA HOSPITAL 50 | Facility: HOSPITAL | Age: 79
End: 2024-04-26
Payer: MEDICARE

## 2024-04-26 DIAGNOSIS — D50.0 IRON DEFICIENCY ANEMIA SECONDARY TO BLOOD LOSS (CHRONIC): ICD-10-CM

## 2024-04-26 DIAGNOSIS — A04.72 ENTEROCOLITIS DUE TO CLOSTRIDIUM DIFFICILE, NOT SPECIFIED AS: ICD-10-CM

## 2024-04-26 DIAGNOSIS — K64.8 OTHER HEMORRHOIDS: ICD-10-CM

## 2024-04-26 DIAGNOSIS — K57.30 DIVERTICULOSIS OF LARGE INTESTINE WITHOUT PERFORATION OR ABS: ICD-10-CM

## 2024-04-26 DIAGNOSIS — K64.4 RESIDUAL HEMORRHOIDAL SKIN TAGS: ICD-10-CM

## 2024-04-26 PROCEDURE — 99233 SBSQ HOSP IP/OBS HIGH 50: CPT | Performed by: NURSE PRACTITIONER

## 2024-04-27 ENCOUNTER — INPATIENT HOSPITAL (OUTPATIENT)
Dept: URBAN - METROPOLITAN AREA HOSPITAL 50 | Facility: HOSPITAL | Age: 79
End: 2024-04-27
Payer: COMMERCIAL

## 2024-04-27 DIAGNOSIS — K57.30 DIVERTICULOSIS OF LARGE INTESTINE WITHOUT PERFORATION OR ABS: ICD-10-CM

## 2024-04-27 DIAGNOSIS — K64.8 OTHER HEMORRHOIDS: ICD-10-CM

## 2024-04-27 DIAGNOSIS — D50.0 IRON DEFICIENCY ANEMIA SECONDARY TO BLOOD LOSS (CHRONIC): ICD-10-CM

## 2024-04-27 DIAGNOSIS — K64.4 RESIDUAL HEMORRHOIDAL SKIN TAGS: ICD-10-CM

## 2024-04-27 DIAGNOSIS — A04.72 ENTEROCOLITIS DUE TO CLOSTRIDIUM DIFFICILE, NOT SPECIFIED AS: ICD-10-CM

## 2024-04-27 PROCEDURE — 99233 SBSQ HOSP IP/OBS HIGH 50: CPT | Performed by: INTERNAL MEDICINE

## 2024-04-28 PROCEDURE — 99233 SBSQ HOSP IP/OBS HIGH 50: CPT | Performed by: INTERNAL MEDICINE

## 2024-04-29 ENCOUNTER — INPATIENT HOSPITAL (OUTPATIENT)
Dept: URBAN - METROPOLITAN AREA HOSPITAL 50 | Facility: HOSPITAL | Age: 79
End: 2024-04-29
Payer: COMMERCIAL

## 2024-04-29 DIAGNOSIS — K64.4 RESIDUAL HEMORRHOIDAL SKIN TAGS: ICD-10-CM

## 2024-04-29 DIAGNOSIS — D50.0 IRON DEFICIENCY ANEMIA SECONDARY TO BLOOD LOSS (CHRONIC): ICD-10-CM

## 2024-04-29 DIAGNOSIS — A04.72 ENTEROCOLITIS DUE TO CLOSTRIDIUM DIFFICILE, NOT SPECIFIED AS: ICD-10-CM

## 2024-04-29 DIAGNOSIS — K64.8 OTHER HEMORRHOIDS: ICD-10-CM

## 2024-04-29 DIAGNOSIS — K57.30 DIVERTICULOSIS OF LARGE INTESTINE WITHOUT PERFORATION OR ABS: ICD-10-CM

## 2024-04-29 PROCEDURE — 99233 SBSQ HOSP IP/OBS HIGH 50: CPT | Performed by: NURSE PRACTITIONER

## 2024-04-30 PROCEDURE — 99233 SBSQ HOSP IP/OBS HIGH 50: CPT | Performed by: NURSE PRACTITIONER

## 2024-05-01 ENCOUNTER — INPATIENT HOSPITAL (OUTPATIENT)
Dept: URBAN - METROPOLITAN AREA HOSPITAL 50 | Facility: HOSPITAL | Age: 79
End: 2024-05-01
Payer: MEDICARE

## 2024-05-01 DIAGNOSIS — R74.01 ELEVATION OF LEVELS OF LIVER TRANSAMINASE LEVELS: ICD-10-CM

## 2024-05-01 DIAGNOSIS — D50.0 IRON DEFICIENCY ANEMIA SECONDARY TO BLOOD LOSS (CHRONIC): ICD-10-CM

## 2024-05-01 DIAGNOSIS — A04.72 ENTEROCOLITIS DUE TO CLOSTRIDIUM DIFFICILE, NOT SPECIFIED AS: ICD-10-CM

## 2024-05-01 DIAGNOSIS — K57.30 DIVERTICULOSIS OF LARGE INTESTINE WITHOUT PERFORATION OR ABS: ICD-10-CM

## 2024-05-01 PROCEDURE — 99233 SBSQ HOSP IP/OBS HIGH 50: CPT | Performed by: NURSE PRACTITIONER

## 2024-05-02 PROCEDURE — 99233 SBSQ HOSP IP/OBS HIGH 50: CPT | Performed by: NURSE PRACTITIONER
